# Patient Record
Sex: MALE | Race: WHITE | NOT HISPANIC OR LATINO | Employment: STUDENT | URBAN - METROPOLITAN AREA
[De-identification: names, ages, dates, MRNs, and addresses within clinical notes are randomized per-mention and may not be internally consistent; named-entity substitution may affect disease eponyms.]

---
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
## 2019-01-21 PROBLEM — T81.89XA SURGICAL WOUND, NON HEALING: Status: ACTIVE | Noted: 2019-01-21

## 2020-03-02 ENCOUNTER — TELEPHONE (OUTPATIENT)
Dept: FAMILY MEDICINE | Facility: CLINIC | Age: 20
End: 2020-03-02

## 2020-05-12 ENCOUNTER — OFFICE VISIT (OUTPATIENT)
Dept: FAMILY MEDICINE | Facility: CLINIC | Age: 20
End: 2020-05-12
Payer: MEDICAID

## 2020-05-12 VITALS
BODY MASS INDEX: 46.65 KG/M2 | SYSTOLIC BLOOD PRESSURE: 128 MMHG | HEART RATE: 112 BPM | TEMPERATURE: 98 F | HEIGHT: 69 IN | DIASTOLIC BLOOD PRESSURE: 86 MMHG | WEIGHT: 315 LBS | OXYGEN SATURATION: 97 %

## 2020-05-12 DIAGNOSIS — F90.2 ATTENTION DEFICIT HYPERACTIVITY DISORDER (ADHD), COMBINED TYPE: Primary | ICD-10-CM

## 2020-05-12 DIAGNOSIS — Z13.220 ENCOUNTER FOR LIPID SCREENING FOR CARDIOVASCULAR DISEASE: ICD-10-CM

## 2020-05-12 DIAGNOSIS — E29.1 HYPOGONADISM IN MALE: ICD-10-CM

## 2020-05-12 DIAGNOSIS — Z13.6 ENCOUNTER FOR LIPID SCREENING FOR CARDIOVASCULAR DISEASE: ICD-10-CM

## 2020-05-12 DIAGNOSIS — R62.50 DEVELOPMENTAL DELAY: ICD-10-CM

## 2020-05-12 DIAGNOSIS — Z00.00 ROUTINE HEALTH MAINTENANCE: ICD-10-CM

## 2020-05-12 DIAGNOSIS — Q75.3 MACROCEPHALY: ICD-10-CM

## 2020-05-12 PROCEDURE — 90471 IMMUNIZATION ADMIN: CPT | Mod: S$GLB,,, | Performed by: FAMILY MEDICINE

## 2020-05-12 PROCEDURE — 90651 HPV VACCINE 9-VALENT 3 DOSE IM: ICD-10-PCS | Mod: S$GLB,,, | Performed by: FAMILY MEDICINE

## 2020-05-12 PROCEDURE — 99213 PR OFFICE/OUTPT VISIT, EST, LEVL III, 20-29 MIN: ICD-10-PCS | Mod: 25,S$GLB,, | Performed by: FAMILY MEDICINE

## 2020-05-12 PROCEDURE — 99213 OFFICE O/P EST LOW 20 MIN: CPT | Mod: 25,S$GLB,, | Performed by: FAMILY MEDICINE

## 2020-05-12 PROCEDURE — 90651 9VHPV VACCINE 2/3 DOSE IM: CPT | Mod: S$GLB,,, | Performed by: FAMILY MEDICINE

## 2020-05-12 PROCEDURE — 90471 HPV VACCINE 9-VALENT 3 DOSE IM: ICD-10-PCS | Mod: S$GLB,,, | Performed by: FAMILY MEDICINE

## 2020-05-12 RX ORDER — METHYLPHENIDATE HYDROCHLORIDE 54 MG/1
54 TABLET ORAL EVERY MORNING
Qty: 30 TABLET | Refills: 0 | Status: SHIPPED | OUTPATIENT
Start: 2020-05-12 | End: 2020-06-10 | Stop reason: SDUPTHER

## 2020-05-12 RX ORDER — CETIRIZINE HYDROCHLORIDE 10 MG/1
10 TABLET ORAL DAILY
Qty: 90 TABLET | Refills: 3 | Status: SHIPPED | OUTPATIENT
Start: 2020-05-12 | End: 2021-12-21

## 2020-05-12 NOTE — PROGRESS NOTES
" Patient ID: James Loya is a 19 y.o. male.    Chief Complaint: Establish Care and Medication Refill    HPI      James Loya is a 19 y.o. male here for establishment of care.  Patient is a 19-year-old gentleman with developmental delay.  He has completed high school however was very difficult and almost was not able to achieve goal.  Currently does not Dr.-would like to go to  school but cannot live on his own in an area to go to school.  Under his parents care.  Without medication-ADHD-Concerta-very difficult for him to focus-last a lot and is unable to accomplish tasks.  Takes on a daily basis.    History of asthma however no recent problems.    Obesity-has lost nearly 100 exercising-riding his bike on the keto diet.    The overall no complaints at this time    Vitals:    05/12/20 0917   BP: 128/86   Pulse: (!) 112   Temp: 98.4 °F (36.9 °C)   SpO2: 97%   Weight: (!) 147.1 kg (324 lb 6.5 oz)   Height: 5' 9" (1.753 m)            Review of Symptoms    Constitutional  Neg activity change, No chills /fever   Resp  Neg hemoptysis, stridor, choking  CVS  Neg chest pain, palpitations  Other review systems up to 14 the normal    Physical Exam    Constitutional:  Oriented to person, place, and time.appears well-developed and well-nourished.  No distress.      HENT  Head: Normocephalic and atraumatic  Right Ear: External ear normal.   Left Ear: External ear normal.   Nose: External nose normal.   Mouth:  Moist mucus membranes.    Eyes:  Conjunctivae are normal. Right eye exhibits no discharge.  Left eye exhibits no discharge. No scleral icterus.  No periorbital edema    Cardiovascular:  Regular rate and rhythm with normal S1 and S2     Pulmonary/Chest:   Clear to auscultation bilaterally without wheezes, rhonchi or rales      Musculoskeletal:  No edema. No obvious deformity No wasting       Neurological:  Alert and oriented to person, place, and time.   Coordination normal.     Skin:   Skin is " warm and dry.  No diaphoresis.   No rash noted.     Psychiatric: Normal mood and affect. Behavior is normal.  Judgment and thought content normal.     Complete Blood Count  Lab Results   Component Value Date    RBC 4.29 (L) 12/20/2018    HGB 14.0 12/20/2018    HCT 41.2 12/20/2018    MCV 96 12/20/2018    MCH 32.6 (H) 12/20/2018    MCHC 34.0 12/20/2018    RDW 12.5 12/20/2018     12/20/2018    MPV 10.3 12/20/2018    GRAN 6.3 12/20/2018    GRAN 71.6 12/20/2018    LYMPH 1.5 12/20/2018    LYMPH 16.4 (L) 12/20/2018    MONO 0.9 12/20/2018    MONO 10.0 12/20/2018    EOS 0.2 12/20/2018    BASO 0.01 12/20/2018    EOSINOPHIL 1.7 12/20/2018    BASOPHIL 0.1 12/20/2018    DIFFMETHOD Automated 12/20/2018       Comprehensive Metabolic Panel  No results found for: GLU, BUN, CREATININE, NA, K, CL, PROT, ALBUMIN, BILITOT, AST, ALKPHOS, CO2, ALT, ANIONGAP, EGFRNONAA, ESTGFRAFRICA    TSH  No results found for: TSH    Assessment / Plan:      ICD-10-CM ICD-9-CM   1. Attention deficit hyperactivity disorder (ADHD), combined type F90.2 314.01   2. Routine health maintenance Z00.00 V70.0   3. Encounter for lipid screening for cardiovascular disease Z13.220 V77.91    Z13.6 V81.2   4. Macrocephaly Q75.3 756.0   5. Developmental delay R62.50 783.40   6. Hypogonadism in male E29.1 257.2     Attention deficit hyperactivity disorder (ADHD), combined type    Routine health maintenance  -     Comprehensive metabolic panel; Future; Expected date: 05/12/2020  -     CBC auto differential; Future; Expected date: 05/12/2020  -     TSH; Future; Expected date: 05/12/2020  -     Lipid Panel; Future; Expected date: 05/12/2020  -     Testosterone; Future  -     T4, free; Future; Expected date: 05/12/2020    Encounter for lipid screening for cardiovascular disease  -     Comprehensive metabolic panel; Future; Expected date: 05/12/2020  -     CBC auto differential; Future; Expected date: 05/12/2020  -     TSH; Future; Expected date: 05/12/2020  -      Lipid Panel; Future; Expected date: 05/12/2020    Macrocephaly  -     Comprehensive metabolic panel; Future; Expected date: 05/12/2020  -     CBC auto differential; Future; Expected date: 05/12/2020  -     TSH; Future; Expected date: 05/12/2020  -     Lipid Panel; Future; Expected date: 05/12/2020  -     Testosterone; Future  -     T4, free; Future; Expected date: 05/12/2020    Developmental delay  -     Comprehensive metabolic panel; Future; Expected date: 05/12/2020  -     CBC auto differential; Future; Expected date: 05/12/2020  -     TSH; Future; Expected date: 05/12/2020  -     Lipid Panel; Future; Expected date: 05/12/2020  -     Testosterone; Future  -     T4, free; Future; Expected date: 05/12/2020    Hypogonadism in male  -     Comprehensive metabolic panel; Future; Expected date: 05/12/2020  -     CBC auto differential; Future; Expected date: 05/12/2020  -     TSH; Future; Expected date: 05/12/2020  -     Lipid Panel; Future; Expected date: 05/12/2020  -     Testosterone; Future  -     T4, free; Future; Expected date: 05/12/2020    Other orders  -     methylphenidate HCl (CONCERTA) 54 MG CR tablet; Take 1 tablet (54 mg total) by mouth every morning.  Dispense: 30 tablet; Refill: 0  -     cetirizine (ZYRTEC) 10 MG tablet; Take 1 tablet (10 mg total) by mouth once daily.  Dispense: 90 tablet; Refill: 3  -     (In Office Administered) HPV Vaccine (9-Valent) (3 Dose) (IM)     Discussed routine wellness-physical sexual-Mental Health.  Continue weight loss    History hypogonadism is a male-no other known endocrine deficiencies per his mother.

## 2020-05-28 ENCOUNTER — LAB VISIT (OUTPATIENT)
Dept: LAB | Facility: HOSPITAL | Age: 20
End: 2020-05-28
Attending: FAMILY MEDICINE
Payer: MEDICAID

## 2020-05-28 DIAGNOSIS — E29.1 HYPOGONADISM IN MALE: ICD-10-CM

## 2020-05-28 DIAGNOSIS — Z13.6 ENCOUNTER FOR LIPID SCREENING FOR CARDIOVASCULAR DISEASE: ICD-10-CM

## 2020-05-28 DIAGNOSIS — R62.50 DEVELOPMENTAL DELAY: ICD-10-CM

## 2020-05-28 DIAGNOSIS — Z13.220 ENCOUNTER FOR LIPID SCREENING FOR CARDIOVASCULAR DISEASE: ICD-10-CM

## 2020-05-28 DIAGNOSIS — Z00.00 ROUTINE HEALTH MAINTENANCE: ICD-10-CM

## 2020-05-28 DIAGNOSIS — Q75.3 MACROCEPHALY: ICD-10-CM

## 2020-05-28 LAB
ALBUMIN SERPL BCP-MCNC: 4.2 G/DL (ref 3.5–5.2)
ALP SERPL-CCNC: 62 U/L (ref 38–126)
ALT SERPL W/O P-5'-P-CCNC: 22 U/L (ref 10–44)
ANION GAP SERPL CALC-SCNC: 6 MMOL/L (ref 8–16)
AST SERPL-CCNC: 26 U/L (ref 15–46)
BASOPHILS # BLD AUTO: 0.04 K/UL (ref 0–0.2)
BASOPHILS NFR BLD: 0.5 % (ref 0–1.9)
BILIRUB SERPL-MCNC: 0.6 MG/DL (ref 0.1–1)
BUN SERPL-MCNC: 18 MG/DL (ref 2–20)
CALCIUM SERPL-MCNC: 9.4 MG/DL (ref 8.7–10.5)
CHLORIDE SERPL-SCNC: 105 MMOL/L (ref 95–110)
CHOLEST SERPL-MCNC: 149 MG/DL (ref 120–199)
CHOLEST/HDLC SERPL: 3.8 {RATIO} (ref 2–5)
CO2 SERPL-SCNC: 28 MMOL/L (ref 23–29)
CREAT SERPL-MCNC: 0.75 MG/DL (ref 0.5–1.4)
DIFFERENTIAL METHOD: ABNORMAL
EOSINOPHIL # BLD AUTO: 0.2 K/UL (ref 0–0.5)
EOSINOPHIL NFR BLD: 2.1 % (ref 0–8)
ERYTHROCYTE [DISTWIDTH] IN BLOOD BY AUTOMATED COUNT: 11.7 % (ref 11.5–14.5)
EST. GFR  (AFRICAN AMERICAN): >60 ML/MIN/1.73 M^2
EST. GFR  (NON AFRICAN AMERICAN): >60 ML/MIN/1.73 M^2
GLUCOSE SERPL-MCNC: 86 MG/DL (ref 70–110)
HCT VFR BLD AUTO: 44.5 % (ref 40–54)
HDLC SERPL-MCNC: 39 MG/DL (ref 40–75)
HDLC SERPL: 26.2 % (ref 20–50)
HGB BLD-MCNC: 15.2 G/DL (ref 14–18)
IMM GRANULOCYTES # BLD AUTO: 0.02 K/UL (ref 0–0.04)
IMM GRANULOCYTES NFR BLD AUTO: 0.3 % (ref 0–0.5)
LDLC SERPL CALC-MCNC: 97.4 MG/DL (ref 63–159)
LYMPHOCYTES # BLD AUTO: 2.6 K/UL (ref 1–4.8)
LYMPHOCYTES NFR BLD: 34.6 % (ref 18–48)
MCH RBC QN AUTO: 32.8 PG (ref 27–31)
MCHC RBC AUTO-ENTMCNC: 34.2 G/DL (ref 32–36)
MCV RBC AUTO: 96 FL (ref 82–98)
MONOCYTES # BLD AUTO: 0.6 K/UL (ref 0.3–1)
MONOCYTES NFR BLD: 8.4 % (ref 4–15)
NEUTROPHILS # BLD AUTO: 4.1 K/UL (ref 1.8–7.7)
NEUTROPHILS NFR BLD: 54.1 % (ref 38–73)
NONHDLC SERPL-MCNC: 110 MG/DL
NRBC BLD-RTO: 0 /100 WBC
PLATELET # BLD AUTO: 243 K/UL (ref 150–350)
PMV BLD AUTO: 11.2 FL (ref 9.2–12.9)
POTASSIUM SERPL-SCNC: 4.5 MMOL/L (ref 3.5–5.1)
PROT SERPL-MCNC: 7.1 G/DL (ref 6–8.4)
RBC # BLD AUTO: 4.63 M/UL (ref 4.6–6.2)
SODIUM SERPL-SCNC: 139 MMOL/L (ref 136–145)
T4 FREE SERPL-MCNC: 1.13 NG/DL (ref 0.71–1.51)
TESTOST SERPL-MCNC: 490 NG/DL (ref 304–1227)
TRIGL SERPL-MCNC: 63 MG/DL (ref 30–150)
TSH SERPL DL<=0.005 MIU/L-ACNC: 3.01 UIU/ML (ref 0.4–4)
WBC # BLD AUTO: 7.58 K/UL (ref 3.9–12.7)

## 2020-05-28 PROCEDURE — 36415 COLL VENOUS BLD VENIPUNCTURE: CPT | Mod: PO

## 2020-05-28 PROCEDURE — 85025 COMPLETE CBC W/AUTO DIFF WBC: CPT | Mod: PO

## 2020-05-28 PROCEDURE — 84403 ASSAY OF TOTAL TESTOSTERONE: CPT | Mod: PO

## 2020-05-28 PROCEDURE — 80053 COMPREHEN METABOLIC PANEL: CPT | Mod: PO

## 2020-05-28 PROCEDURE — 84443 ASSAY THYROID STIM HORMONE: CPT | Mod: PO

## 2020-05-28 PROCEDURE — 84439 ASSAY OF FREE THYROXINE: CPT

## 2020-05-28 PROCEDURE — 80061 LIPID PANEL: CPT

## 2020-06-10 RX ORDER — METHYLPHENIDATE HYDROCHLORIDE 54 MG/1
54 TABLET ORAL EVERY MORNING
Qty: 30 TABLET | Refills: 0 | Status: SHIPPED | OUTPATIENT
Start: 2020-06-10 | End: 2020-07-12 | Stop reason: SDUPTHER

## 2020-08-10 RX ORDER — METHYLPHENIDATE HYDROCHLORIDE 54 MG/1
54 TABLET ORAL EVERY MORNING
Qty: 30 TABLET | Refills: 0 | Status: SHIPPED | OUTPATIENT
Start: 2020-08-10 | End: 2020-09-11 | Stop reason: SDUPTHER

## 2020-09-11 RX ORDER — METHYLPHENIDATE HYDROCHLORIDE 54 MG/1
54 TABLET ORAL EVERY MORNING
Qty: 30 TABLET | Refills: 0 | Status: SHIPPED | OUTPATIENT
Start: 2020-09-11 | End: 2020-10-11 | Stop reason: SDUPTHER

## 2020-09-21 ENCOUNTER — TELEPHONE (OUTPATIENT)
Dept: FAMILY MEDICINE | Facility: CLINIC | Age: 20
End: 2020-09-21

## 2020-09-21 NOTE — TELEPHONE ENCOUNTER
----- Message from Geneva Jacob sent at 9/21/2020 10:57 AM CDT -----  Contact: María Gregg (mother)  Type:  Sooner Apoointment Request    Caller is requesting a sooner appointment.  Caller declined first available appointment listed below.  Caller will not accept being placed on the waitlist and is requesting a message be sent to doctor.  Name of Caller: María  When is the first available appointment? 11/24  Symptoms: 6 month f/u ADD meds  Would the patient rather a call back or a response via [facility]?  Call back   Best Call Back Number: [phone]  Additional Information:  due for 10/14

## 2020-09-23 ENCOUNTER — CLINICAL SUPPORT (OUTPATIENT)
Dept: FAMILY MEDICINE | Facility: CLINIC | Age: 20
End: 2020-09-23
Payer: MEDICAID

## 2020-09-23 DIAGNOSIS — Z23 NEED FOR TETANUS BOOSTER: ICD-10-CM

## 2020-09-23 DIAGNOSIS — Z23 NEED FOR INFLUENZA VACCINATION: Primary | ICD-10-CM

## 2020-09-23 DIAGNOSIS — Z23 NEED FOR PNEUMOCOCCAL VACCINATION: ICD-10-CM

## 2020-09-23 PROCEDURE — 90686 FLU VACCINE (QUAD) GREATER THAN OR EQUAL TO 3YO PRESERVATIVE FREE IM: ICD-10-PCS | Mod: S$GLB,,, | Performed by: FAMILY MEDICINE

## 2020-09-23 PROCEDURE — 90471 FLU VACCINE (QUAD) GREATER THAN OR EQUAL TO 3YO PRESERVATIVE FREE IM: ICD-10-PCS | Mod: S$GLB,,, | Performed by: FAMILY MEDICINE

## 2020-09-23 PROCEDURE — 90715 TDAP VACCINE GREATER THAN OR EQUAL TO 7YO IM: ICD-10-PCS | Mod: S$GLB,,, | Performed by: FAMILY MEDICINE

## 2020-09-23 PROCEDURE — 90471 IMMUNIZATION ADMIN: CPT | Mod: S$GLB,,, | Performed by: FAMILY MEDICINE

## 2020-09-23 PROCEDURE — 90472 IMMUNIZATION ADMIN EACH ADD: CPT | Mod: S$GLB,,, | Performed by: FAMILY MEDICINE

## 2020-09-23 PROCEDURE — 90715 TDAP VACCINE 7 YRS/> IM: CPT | Mod: S$GLB,,, | Performed by: FAMILY MEDICINE

## 2020-09-23 PROCEDURE — 90472 PR IMMUNIZ,ADMIN,EACH ADDL: ICD-10-PCS | Mod: S$GLB,,, | Performed by: FAMILY MEDICINE

## 2020-09-23 PROCEDURE — 90732 PNEUMOCOCCAL POLYSACCHARIDE VACCINE 23-VALENT =>2YO SQ IM: ICD-10-PCS | Mod: S$GLB,,, | Performed by: FAMILY MEDICINE

## 2020-09-23 PROCEDURE — 90732 PPSV23 VACC 2 YRS+ SUBQ/IM: CPT | Mod: S$GLB,,, | Performed by: FAMILY MEDICINE

## 2020-09-23 PROCEDURE — 90686 IIV4 VACC NO PRSV 0.5 ML IM: CPT | Mod: S$GLB,,, | Performed by: FAMILY MEDICINE

## 2020-10-12 RX ORDER — METHYLPHENIDATE HYDROCHLORIDE 54 MG/1
54 TABLET ORAL EVERY MORNING
Qty: 30 TABLET | Refills: 0 | Status: SHIPPED | OUTPATIENT
Start: 2020-10-12 | End: 2020-11-02 | Stop reason: SDUPTHER

## 2020-10-15 ENCOUNTER — TELEPHONE (OUTPATIENT)
Dept: ADMINISTRATIVE | Facility: HOSPITAL | Age: 20
End: 2020-10-15

## 2020-10-15 ENCOUNTER — CLINICAL SUPPORT (OUTPATIENT)
Dept: FAMILY MEDICINE | Facility: CLINIC | Age: 20
End: 2020-10-15
Payer: MEDICAID

## 2020-10-15 NOTE — TELEPHONE ENCOUNTER
Contacted pt to discuss that during a routine audit we discovered a variation in the temperature of our medication refrigerator which may have affected the potency and effectiveness of our vaccines. The vaccine was not harmful but may have been ineffective so we recommend revaccination. No answer, left detailed voice message with pt mom.

## 2020-11-02 ENCOUNTER — OFFICE VISIT (OUTPATIENT)
Dept: FAMILY MEDICINE | Facility: CLINIC | Age: 20
End: 2020-11-02
Payer: MEDICAID

## 2020-11-02 VITALS
HEART RATE: 93 BPM | SYSTOLIC BLOOD PRESSURE: 130 MMHG | BODY MASS INDEX: 46.65 KG/M2 | WEIGHT: 315 LBS | HEIGHT: 69 IN | TEMPERATURE: 97 F | OXYGEN SATURATION: 96 % | DIASTOLIC BLOOD PRESSURE: 60 MMHG

## 2020-11-02 DIAGNOSIS — R62.50 DEVELOPMENTAL DELAY: ICD-10-CM

## 2020-11-02 DIAGNOSIS — F98.8 ATTENTION DEFICIT DISORDER, UNSPECIFIED HYPERACTIVITY PRESENCE: Primary | ICD-10-CM

## 2020-11-02 PROCEDURE — 99213 PR OFFICE/OUTPT VISIT, EST, LEVL III, 20-29 MIN: ICD-10-PCS | Mod: S$GLB,,, | Performed by: FAMILY MEDICINE

## 2020-11-02 PROCEDURE — 99213 OFFICE O/P EST LOW 20 MIN: CPT | Mod: S$GLB,,, | Performed by: FAMILY MEDICINE

## 2020-11-02 RX ORDER — METHYLPHENIDATE HYDROCHLORIDE 54 MG/1
54 TABLET ORAL EVERY MORNING
Qty: 30 TABLET | Refills: 0 | Status: SHIPPED | OUTPATIENT
Start: 2021-01-09 | End: 2021-12-21 | Stop reason: SDUPTHER

## 2020-11-02 RX ORDER — METHYLPHENIDATE HYDROCHLORIDE 54 MG/1
54 TABLET ORAL EVERY MORNING
Qty: 30 TABLET | Refills: 0 | Status: SHIPPED | OUTPATIENT
Start: 2020-11-11 | End: 2021-01-10 | Stop reason: SDUPTHER

## 2020-11-02 RX ORDER — METHYLPHENIDATE HYDROCHLORIDE 54 MG/1
54 TABLET ORAL EVERY MORNING
Qty: 30 TABLET | Refills: 0 | Status: SHIPPED | OUTPATIENT
Start: 2020-12-11 | End: 2021-12-21 | Stop reason: SDUPTHER

## 2020-11-02 NOTE — PROGRESS NOTES
"   Patient ID: James Loya is a 20 y.o. male.    Chief Complaint: Follow-up (6 month) and add    HPI      Patient here for refill of the medicines used to treat attention deficit disorder. Doing well on the medicines and does not want to change. No significant weight changes, tachycardia or agitation.  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  Discussed that this is a good job for him to walk and do exercise.        Review of Symptoms    Constitutional  Neg activity change, chills fever   Resp  Neg hemoptysis, stridor, choking  CVS  Neg chest pain, palpitations    Physical Exam  Vitals:    11/02/20 0824   BP: 130/60   BP Location: Right arm   Patient Position: Sitting   Pulse: 93   Temp: 97.2 °F (36.2 °C)   TempSrc: Oral   SpO2: 96%   Weight: (!) 155.4 kg (342 lb 11.3 oz)   Height: 5' 9" (1.753 m)        Constitutional:   Oriented to person, place, and time.appears well-developed and well-nourished.   No distress.     HENT  Head: Normocephalic and atraumatic  Right Ear: External ear normal.   Left Ear: External ear normal.   Nose: External nose normal.   Mouth: Moist Mucus Membranes  Eyes:   Conjunctivae are normal. Right eye exhibits no discharge. Left eye exhibits no discharge. No scleral icterus. No periorbital edema    Cardiovascular:  Regular rate and rhythm with normal S1 and S2     Pulmonary/Chest:   Clear to auscultation bilaterally without wheezes, rhonchi or rales    Musculoskeletal:  No edema. No obvious deformity No wasting   Neurological:   alert and oriented to person, place, and time. Coordination normal.     Skin:   Skin is warm and dry. No rash noted.  No diaphoresis.     Psychiatric: Normal mood and affect. Behavior is normal. Judgment and thought   content normal.       Assessment / Plan:        ICD-10-CM ICD-9-CM   1. Attention deficit disorder, unspecified hyperactivity presence  F98.8 314.00   2. Developmental delay  R62.50 783.40     Attention deficit disorder, unspecified hyperactivity " presence    Developmental delay    Other orders  -     methylphenidate HCl 54 MG CR tablet; Take 1 tablet (54 mg total) by mouth every morning.  Dispense: 30 tablet; Refill: 0  -     methylphenidate HCl 54 MG CR tablet; Take 1 tablet (54 mg total) by mouth every morning.  Dispense: 30 tablet; Refill: 0  -     methylphenidate HCl 54 MG CR tablet; Take 1 tablet (54 mg total) by mouth every morning.  Dispense: 30 tablet; Refill: 0          Arnie Foster MD

## 2020-11-11 ENCOUNTER — PATIENT MESSAGE (OUTPATIENT)
Dept: FAMILY MEDICINE | Facility: CLINIC | Age: 20
End: 2020-11-11

## 2021-01-11 RX ORDER — METHYLPHENIDATE HYDROCHLORIDE 54 MG/1
54 TABLET ORAL EVERY MORNING
Qty: 30 TABLET | Refills: 0 | Status: SHIPPED | OUTPATIENT
Start: 2021-01-11 | End: 2021-02-09 | Stop reason: SDUPTHER

## 2021-02-09 RX ORDER — METHYLPHENIDATE HYDROCHLORIDE 54 MG/1
54 TABLET ORAL EVERY MORNING
Qty: 30 TABLET | Refills: 0 | Status: SHIPPED | OUTPATIENT
Start: 2021-02-09 | End: 2021-04-11 | Stop reason: SDUPTHER

## 2021-04-12 RX ORDER — METHYLPHENIDATE HYDROCHLORIDE 54 MG/1
54 TABLET ORAL EVERY MORNING
Qty: 30 TABLET | Refills: 0 | Status: SHIPPED | OUTPATIENT
Start: 2021-04-12 | End: 2021-05-11 | Stop reason: SDUPTHER

## 2021-05-11 RX ORDER — METHYLPHENIDATE HYDROCHLORIDE 54 MG/1
54 TABLET ORAL EVERY MORNING
Qty: 30 TABLET | Refills: 0 | Status: SHIPPED | OUTPATIENT
Start: 2021-05-11 | End: 2021-06-15 | Stop reason: SDUPTHER

## 2021-05-20 ENCOUNTER — OFFICE VISIT (OUTPATIENT)
Dept: FAMILY MEDICINE | Facility: CLINIC | Age: 21
End: 2021-05-20
Payer: MEDICAID

## 2021-05-20 VITALS
OXYGEN SATURATION: 96 % | WEIGHT: 315 LBS | BODY MASS INDEX: 46.65 KG/M2 | HEART RATE: 64 BPM | TEMPERATURE: 97 F | SYSTOLIC BLOOD PRESSURE: 110 MMHG | HEIGHT: 69 IN | DIASTOLIC BLOOD PRESSURE: 60 MMHG

## 2021-05-20 DIAGNOSIS — F98.8 ATTENTION DEFICIT DISORDER, UNSPECIFIED HYPERACTIVITY PRESENCE: Primary | ICD-10-CM

## 2021-05-20 PROCEDURE — 99213 PR OFFICE/OUTPT VISIT, EST, LEVL III, 20-29 MIN: ICD-10-PCS | Mod: S$GLB,,, | Performed by: FAMILY MEDICINE

## 2021-05-20 PROCEDURE — 99213 OFFICE O/P EST LOW 20 MIN: CPT | Mod: S$GLB,,, | Performed by: FAMILY MEDICINE

## 2021-05-20 RX ORDER — MINERAL OIL
180 ENEMA (ML) RECTAL DAILY
Qty: 90 TABLET | Refills: 0 | Status: SHIPPED | OUTPATIENT
Start: 2021-05-20 | End: 2024-01-10

## 2021-05-20 RX ORDER — FLUTICASONE PROPIONATE 50 MCG
1 SPRAY, SUSPENSION (ML) NASAL DAILY
Qty: 16 G | Refills: 1 | Status: SHIPPED | OUTPATIENT
Start: 2021-05-20 | End: 2024-01-10

## 2021-06-15 RX ORDER — METHYLPHENIDATE HYDROCHLORIDE 54 MG/1
54 TABLET ORAL EVERY MORNING
Qty: 30 TABLET | Refills: 0 | Status: SHIPPED | OUTPATIENT
Start: 2021-06-15 | End: 2021-07-15 | Stop reason: SDUPTHER

## 2021-07-15 RX ORDER — METHYLPHENIDATE HYDROCHLORIDE 54 MG/1
54 TABLET ORAL EVERY MORNING
Qty: 30 TABLET | Refills: 0 | Status: SHIPPED | OUTPATIENT
Start: 2021-07-15 | End: 2021-08-09 | Stop reason: SDUPTHER

## 2021-08-11 RX ORDER — METHYLPHENIDATE HYDROCHLORIDE 54 MG/1
54 TABLET ORAL EVERY MORNING
Qty: 30 TABLET | Refills: 0 | Status: SHIPPED | OUTPATIENT
Start: 2021-08-11 | End: 2021-09-12 | Stop reason: SDUPTHER

## 2021-09-12 RX ORDER — METHYLPHENIDATE HYDROCHLORIDE 54 MG/1
54 TABLET ORAL EVERY MORNING
Qty: 30 TABLET | Refills: 0 | Status: SHIPPED | OUTPATIENT
Start: 2021-09-12 | End: 2021-10-14 | Stop reason: SDUPTHER

## 2021-10-14 RX ORDER — METHYLPHENIDATE HYDROCHLORIDE 54 MG/1
54 TABLET ORAL EVERY MORNING
Qty: 30 TABLET | Refills: 0 | Status: SHIPPED | OUTPATIENT
Start: 2021-10-14 | End: 2021-11-15 | Stop reason: SDUPTHER

## 2021-11-15 RX ORDER — METHYLPHENIDATE HYDROCHLORIDE 54 MG/1
54 TABLET ORAL EVERY MORNING
Qty: 30 TABLET | Refills: 0 | Status: SHIPPED | OUTPATIENT
Start: 2021-11-15 | End: 2021-12-15 | Stop reason: SDUPTHER

## 2021-11-30 ENCOUNTER — TELEPHONE (OUTPATIENT)
Dept: FAMILY MEDICINE | Facility: CLINIC | Age: 21
End: 2021-11-30
Payer: MEDICAID

## 2021-12-15 RX ORDER — METHYLPHENIDATE HYDROCHLORIDE 54 MG/1
54 TABLET ORAL EVERY MORNING
Qty: 30 TABLET | Refills: 0 | Status: SHIPPED | OUTPATIENT
Start: 2021-12-15 | End: 2021-12-21 | Stop reason: SDUPTHER

## 2021-12-21 ENCOUNTER — OFFICE VISIT (OUTPATIENT)
Dept: FAMILY MEDICINE | Facility: CLINIC | Age: 21
End: 2021-12-21
Payer: MEDICAID

## 2021-12-21 VITALS
SYSTOLIC BLOOD PRESSURE: 130 MMHG | OXYGEN SATURATION: 97 % | BODY MASS INDEX: 46.65 KG/M2 | DIASTOLIC BLOOD PRESSURE: 60 MMHG | HEIGHT: 69 IN | WEIGHT: 315 LBS | TEMPERATURE: 98 F | HEART RATE: 98 BPM

## 2021-12-21 DIAGNOSIS — F98.8 ATTENTION DEFICIT DISORDER, UNSPECIFIED HYPERACTIVITY PRESENCE: Primary | ICD-10-CM

## 2021-12-21 DIAGNOSIS — R62.50 DEVELOPMENTAL DELAY: ICD-10-CM

## 2021-12-21 PROCEDURE — 3008F BODY MASS INDEX DOCD: CPT | Mod: CPTII,S$GLB,, | Performed by: FAMILY MEDICINE

## 2021-12-21 PROCEDURE — 3008F PR BODY MASS INDEX (BMI) DOCUMENTED: ICD-10-PCS | Mod: CPTII,S$GLB,, | Performed by: FAMILY MEDICINE

## 2021-12-21 PROCEDURE — 99214 PR OFFICE/OUTPT VISIT, EST, LEVL IV, 30-39 MIN: ICD-10-PCS | Mod: S$GLB,,, | Performed by: FAMILY MEDICINE

## 2021-12-21 PROCEDURE — 1159F PR MEDICATION LIST DOCUMENTED IN MEDICAL RECORD: ICD-10-PCS | Mod: CPTII,S$GLB,, | Performed by: FAMILY MEDICINE

## 2021-12-21 PROCEDURE — 99214 OFFICE O/P EST MOD 30 MIN: CPT | Mod: S$GLB,,, | Performed by: FAMILY MEDICINE

## 2021-12-21 PROCEDURE — 1159F MED LIST DOCD IN RCRD: CPT | Mod: CPTII,S$GLB,, | Performed by: FAMILY MEDICINE

## 2021-12-21 RX ORDER — METHYLPHENIDATE HYDROCHLORIDE 54 MG/1
54 TABLET ORAL EVERY MORNING
Qty: 30 TABLET | Refills: 0 | Status: SHIPPED | OUTPATIENT
Start: 2022-01-14 | End: 2021-12-21 | Stop reason: SDUPTHER

## 2021-12-21 RX ORDER — METHYLPHENIDATE HYDROCHLORIDE 54 MG/1
54 TABLET ORAL EVERY MORNING
Qty: 30 TABLET | Refills: 0 | Status: SHIPPED | OUTPATIENT
Start: 2022-03-15 | End: 2022-04-14

## 2021-12-21 RX ORDER — METHYLPHENIDATE HYDROCHLORIDE 54 MG/1
54 TABLET ORAL EVERY MORNING
Qty: 30 TABLET | Refills: 0 | Status: SHIPPED | OUTPATIENT
Start: 2022-02-13 | End: 2022-06-21

## 2022-01-12 ENCOUNTER — PATIENT MESSAGE (OUTPATIENT)
Dept: FAMILY MEDICINE | Facility: CLINIC | Age: 22
End: 2022-01-12
Payer: MEDICAID

## 2022-01-12 RX ORDER — METHYLPHENIDATE HYDROCHLORIDE 54 MG/1
54 TABLET ORAL EVERY MORNING
Qty: 30 TABLET | Refills: 0 | Status: SHIPPED | OUTPATIENT
Start: 2022-01-13 | End: 2022-06-21

## 2022-02-03 NOTE — ED PROVIDER NOTES
DAKOTAH Rodriguez M Gastro Nurse Msg Pool  Advise patient of + clostridium difficile.  Likely cause of her continued symptoms.  Needs vancomycin 125 mg QID x 10 days.  Rx pended.  Verify pharmacy she wants sent to.  C diff is highly contagious.  Discuss importance of hand hygiene following using bathroom.  Clean bathroom surfaces with bleach type solution.  CDC has information for patients on c diff.  She should update me after treatment on how she is doing.  Keep scheduled colonoscopy d/t diverticulitis history.      Encounter Date: 12/18/2018    SCRIBE #1 NOTE: I, Mehdi Payne, am scribing for, and in the presence of,  Dr. Jack. I have scribed the entire note.       History     Chief Complaint   Patient presents with    Abscess     Pt has abscess to left lower extremity. Pt has been on antibitoics for this since 11/25- first Bactrim then Clindamycin now.      This is a 18 y.o. male who  has a past medical history of ADHD, Asthma, well controlled, Developmental delay, Mild mental retardation, Obesity, and Snoring who presents with chief complaint of abscess to left lower extremity for the past 3 weeks. He initially hit his leg on a table, sustaining a wound to the lower left led. He has been on two separate courses of Bactrim and Clindamycin since 11/25 with no resolution of the abscess, and has had an I&D. The patient notes pain with weight-bearing and ambulation, and the area is red and warm to touch. Family also reports an increase in swelling and spreading of the redness since this morning. Mother reports the patient had a 101 F fever earlier today, but fever resolved with Tylenol. On arrival, patient is afebrile with a HR of 135.      The history is provided by the patient.     Review of patient's allergies indicates:  No Known Allergies  Past Medical History:   Diagnosis Date    ADHD     Asthma, well controlled     Developmental delay     Mild mental retardation     Obesity     Snoring      Past Surgical History:   Procedure Laterality Date    None      SEPTOPLASTY Bilateral 9/7/2017    Performed by [facility] at [facility] OR 1ST FLR    TONSILLECTOMY Bilateral 9/7/2017    Performed by [facility]abbir [facility] MD at [facility] OR 1ST FLR    TYMPANOSTOMY TUBE PLACEMENT       Family History   Problem Relation Age of Onset    Atrial fibrillation Maternal Grandfather      Social History     Tobacco Use    Smoking status: Never Smoker    Smokeless tobacco: Never Used    Tobacco comment: No NAZIA   Substance Use Topics     Alcohol use: No    Drug use: No     Review of Systems   Constitutional: Negative for chills and fever.   HENT: Negative for facial swelling and trouble swallowing.    Eyes: Negative for redness.   Respiratory: Negative for shortness of breath.    Cardiovascular: Negative for chest pain.   Gastrointestinal: Negative for abdominal pain, diarrhea, nausea and vomiting.   Genitourinary: Negative for dysuria and hematuria.   Musculoskeletal: Negative for gait problem.   Skin: Positive for wound. Negative for rash.   Neurological: Negative for facial asymmetry and speech difficulty.     Physical Exam     Initial Vitals [12/18/18 1445]   BP Pulse Resp Temp SpO2   (!) 140/75 (!) 135 20 98.4 °F (36.9 °C) 98 %      MAP       --         Physical Exam    Nursing note and vitals reviewed.  Constitutional: He appears well-developed and well-nourished. He is not diaphoretic. No distress.   HENT:   Head: Normocephalic and atraumatic.   Mouth/Throat: Oropharynx is clear and moist.   Eyes: Conjunctivae and EOM are normal.   Neck: Normal range of motion. Neck supple.   Cardiovascular: Normal rate, regular rhythm and normal heart sounds.   Pulmonary/Chest: Breath sounds normal. No respiratory distress.   Abdominal: Soft. There is no tenderness.   Musculoskeletal: Normal range of motion. He exhibits no edema or tenderness.   Neurological: He is alert and oriented to person, place, and time. He has normal strength.   Skin: Skin is warm and dry. Abscess noted. There is erythema.   Diffuse erythema consistent with cellulitis with a 2 cm x 4 cm open area to the midpart of the tibial crest of the LLE; area is appropriately tender to touch, no active drainage       ED Course   Procedures  Labs Reviewed   CBC W/ AUTO DIFFERENTIAL - Abnormal; Notable for the following components:       Result Value    WBC 14.69 (*)     RBC 4.51 (*)     MCH 32.2 (*)     Gran # (ANC) 11.6 (*)     Mono # 1.2 (*)     Gran% 78.7 (*)     Lymph% 12.4 (*)     All other  components within normal limits   CULTURE, BLOOD   CULTURE, BLOOD   COMPREHENSIVE METABOLIC PANEL   LACTIC ACID, PLASMA   POCT GLUCOSE   POCT GLUCOSE MONITORING CONTINUOUS          X-Rays:   Independently Interpreted Readings:   Other Readings:  Reviewed by myself, read by radiology.    Imaging Results          X-Ray Tibia Fibula 2 View Left (Final result)  Result time 12/18/18 15:42:30    Final result by Amy Devi MD (12/18/18 15:42:30)                 Impression:      As above      Electronically signed by: Amy Devi MD  Date:    12/18/2018  Time:    15:42             Narrative:    EXAMINATION:  XR TIBIA FIBULA 2 VIEW LEFT    CLINICAL HISTORY:  Cellulitis, unspecified    TECHNIQUE:  AP and lateral views of the left tibia and fibula were performed.    COMPARISON:  None.    FINDINGS:  No fracture, no osseous lesions.  Soft tissue swelling seen circumferentially at the lower leg.  More focal soft tissue swelling pretibial region at the mid level of the tibia.  No abnormal periosteal reaction to suggest osteomyelitis or periostitis.                              Medical Decision Making:   Initial Assessment:   Pt is a 19 yo non-immunosuppressed WM who presents with worsening skin infection of LLE. Will ultrasound LE, get basic labs, blood cx, lactate, pain control.   Clinical Tests:   Lab Tests: Ordered and Reviewed  Radiological Study: Ordered and Reviewed  ED Management:  - CBC notable for WBC of 14K; H/H stable   - CMP WNL; no electrolyte abnormalities appreciated; renal function WNL   - Lactic acid WNL   - Blood cultures x2   - Plain radiograph of L tibia negative for acute fracture, dislocation, subcutaneous gas, periosteal elevation  - Attempted to drain small area of abscess, but pt refused to allow me to incise the abscess (a small cavity was appreciated on bedside ultrasound, along with diffuse cobblestoning pattern consistent with cellulitis)  - Pt administered IV clindamycin in ED  following collection of blood culture(s)  - Pt afebrile, but consistently tachycardic (105 -110) during his stay  - In light of pt failing two (2) discrete outpatient treatment regimens for the aforementioned cellulitis, as well as worsening of cellulitis per mother, I feel that the patient would benefit from admission for IV antibiotics and further wound management  - Mother and patient in agreement with plan for admission  - [facility] service to admit patient                        ED Course as of Dec 18 1746   Tue Dec 18, 2018   5238 Sort note: James Loya nontoxic/afebrile 18 y.o.  presented to the ED with c/o worsening cellulitis of the left lower extremity.  Patient presents with paperwork from PCP for concern of deep tissue abscess as he has failed outpatient clindamycin and Bactrim therapy.  Culture does show group B strep with susceptibility to clindamycin however area appears to be worsened.  The patient is nontoxic appearing however noted to be tachycardic      Patient seen and medically screened by Physician assistant in Sort process due to ED crowding.  Appropriate tests and/or medications ordered.  Care transferred to an alternate provider when patient was placed in an Exam Room from the [facility] for physical exam, additional orders, and disposition. 2:48 PM. LC    [LC]      ED Course User Index  [LC] JUMANA Patel     Clinical Impression:     1. Cellulitis      Disposition:   Disposition: Admitted  Condition: Fair      I, Christiano Jack,  personally performed the services described in this documentation. All medical record entries made by the scribe were at my direction and in my presence.  I have reviewed the chart and agree that the record reflects my personal performance and is accurate and complete. Christiano Jack M.D. 5:44 PM12/18/2018     Christiano Jack MD  12/18/18 1874

## 2022-02-27 ENCOUNTER — HOSPITAL ENCOUNTER (EMERGENCY)
Facility: HOSPITAL | Age: 22
Discharge: HOME | End: 2022-02-27
Attending: EMERGENCY MEDICINE
Payer: MEDICAID

## 2022-02-27 VITALS
RESPIRATION RATE: 18 BRPM | HEART RATE: 133 BPM | TEMPERATURE: 99 F | WEIGHT: 315 LBS | SYSTOLIC BLOOD PRESSURE: 144 MMHG | DIASTOLIC BLOOD PRESSURE: 88 MMHG | OXYGEN SATURATION: 96 % | BODY MASS INDEX: 59.07 KG/M2

## 2022-02-27 DIAGNOSIS — S21.212A: Primary | ICD-10-CM

## 2022-02-27 PROCEDURE — 12002 RPR S/N/AX/GEN/TRNK2.6-7.5CM: CPT

## 2022-02-27 PROCEDURE — 12002 RPR S/N/AX/GEN/TRNK2.6-7.5CM: CPT | Mod: ER

## 2022-02-27 PROCEDURE — 99283 EMERGENCY DEPT VISIT LOW MDM: CPT | Mod: ER

## 2022-02-27 PROCEDURE — 25000003 PHARM REV CODE 250: Mod: ER | Performed by: EMERGENCY MEDICINE

## 2022-02-27 RX ORDER — LIDOCAINE HYDROCHLORIDE 10 MG/ML
INJECTION, SOLUTION EPIDURAL; INFILTRATION; INTRACAUDAL; PERINEURAL
Status: DISCONTINUED
Start: 2022-02-27 | End: 2022-02-27 | Stop reason: HOSPADM

## 2022-02-27 RX ORDER — KETOROLAC TROMETHAMINE 10 MG/1
10 TABLET, FILM COATED ORAL
Status: COMPLETED | OUTPATIENT
Start: 2022-02-27 | End: 2022-02-27

## 2022-02-27 RX ORDER — KETOROLAC TROMETHAMINE 10 MG/1
10 TABLET, FILM COATED ORAL EVERY 8 HOURS PRN
Qty: 15 TABLET | Refills: 0 | Status: SHIPPED | OUTPATIENT
Start: 2022-02-27 | End: 2022-06-21

## 2022-02-27 RX ORDER — LIDOCAINE HYDROCHLORIDE 10 MG/ML
5 INJECTION, SOLUTION EPIDURAL; INFILTRATION; INTRACAUDAL; PERINEURAL
Status: COMPLETED | OUTPATIENT
Start: 2022-02-27 | End: 2022-02-27

## 2022-02-27 RX ADMIN — LIDOCAINE HYDROCHLORIDE 50 MG: 10 INJECTION, SOLUTION EPIDURAL; INFILTRATION; INTRACAUDAL; PERINEURAL at 08:02

## 2022-02-27 RX ADMIN — BACITRACIN, NEOMYCIN, POLYMYXIN B 1 EACH: 400; 3.5; 5 OINTMENT TOPICAL at 09:02

## 2022-02-27 RX ADMIN — KETOROLAC TROMETHAMINE 10 MG: 10 TABLET, FILM COATED ORAL at 09:02

## 2022-02-28 NOTE — ED PROVIDER NOTES
Encounter Date: 2/27/2022       History     Chief Complaint   Patient presents with    Laceration     Pt fell out of the bed a stationary truck. Pt has 2 large gashes on his left upper back. Bleeding is controlled. Pt denies SOB, LOC, head/neck pain     Patient currently presents with a chief complaint laceration.  This is localized to the upper left back.  This occurred just prior to arrival.  This occurred as a result of contact with spiked lug nuts when he feel from the bed of a stationary truck.  Patient notes no apparent foreign body.  Last Tdap in 10/2020.        Review of patient's allergies indicates:  No Known Allergies  Past Medical History:   Diagnosis Date    ADHD     Asthma, well controlled     Developmental delay     Mild mental retardation     Obesity     Snoring      Past Surgical History:   Procedure Laterality Date    ADENOIDECTOMY  2015    HERNIA REPAIR  2004    None      TONSILLECTOMY  2016    TYMPANOSTOMY TUBE PLACEMENT       Family History   Problem Relation Age of Onset    Alcohol abuse Father     Atrial fibrillation Maternal Grandfather     Depression Paternal Grandfather     Mental illness Paternal Grandfather     Drug abuse Paternal Uncle     Heart disease Maternal Grandmother      Social History     Tobacco Use    Smoking status: Never Smoker    Smokeless tobacco: Never Used    Tobacco comment: No NAZIA   Substance Use Topics    Alcohol use: No    Drug use: No     Review of Systems   Constitutional: Negative for chills and fever.   HENT: Negative for congestion and sore throat.    Respiratory: Negative for chest tightness and shortness of breath.    Cardiovascular: Negative for chest pain and palpitations.   Gastrointestinal: Negative for abdominal pain and vomiting.   Genitourinary: Negative for difficulty urinating and dysuria.   Skin: Negative for color change and rash.   Allergic/Immunologic: Negative for immunocompromised state.   Neurological: Negative for  weakness and numbness.   Hematological: Negative for adenopathy.   All other systems reviewed and are negative.    Physical Exam     Initial Vitals [02/27/22 1956]   BP Pulse Resp Temp SpO2   (!) 144/88 (!) 133 18 98.8 °F (37.1 °C) 96 %      MAP       --           Physical Exam    Constitutional: He appears well-developed and well-nourished. He is not diaphoretic. No distress.   HENT:   Head: Normocephalic and atraumatic.   Cardiovascular: Normal rate, regular rhythm, normal heart sounds and intact distal pulses.   Pulmonary/Chest: Breath sounds normal. No respiratory distress.   Musculoskeletal:      Thoracic back: Laceration present.        Back:       Comments: No foreign body or gross contamination noted.     Neurological: He is alert and oriented to person, place, and time.   Skin: Skin is warm and dry.   Psychiatric: He has a normal mood and affect. His behavior is normal.         ED Course   Lac Repair    Date/Time: 2/27/2022 9:47 PM  Performed by: Gabino Green MD  Authorized by: Gabino Green MD     Consent:     Consent obtained:  Verbal    Consent given by:  Patient    Risks discussed:  Poor wound healing, poor cosmetic result and infection  Universal protocol:     Patient identity confirmed:  Verbally with patient  Anesthesia:     Anesthesia method:  Local infiltration    Local anesthetic:  Lidocaine 1% w/o epi  Laceration details:     Location:  Trunk    Trunk location:  Upper back    Length (cm):  6  Pre-procedure details:     Preparation:  Patient was prepped and draped in usual sterile fashion  Exploration:     Wound extent: areolar tissue violated      Wound extent: no fascia violation noted, no foreign bodies/material noted and no muscle damage noted      Contaminated: no    Treatment:     Area cleansed with:  Betadine    Amount of cleaning:  Extensive    Debridement:  None  Skin repair:     Repair method:  Sutures    Suture size:  3-0    Suture material:  Prolene    Number of sutures:   7  Approximation:     Approximation:  Close  Repair type:     Repair type:  Simple  Post-procedure details:     Dressing:  Sterile dressing    Procedure completion:  Tolerated well, no immediate complications      Labs Reviewed - No data to display       Imaging Results    None          Medications   LIDOcaine (PF) 10 mg/ml (1%) 10 mg/mL (1 %) injection (has no administration in time range)   LIDOcaine (PF) 10 mg/ml (1%) injection 50 mg (50 mg Other Given 2/27/22 2013)   neomycin-bacitracnZn-polymyxnB packet 1 each (1 each Topical (Top) Given 2/27/22 2112)   ketorolac tablet 10 mg (10 mg Oral Given 2/27/22 2112)     Medical Decision Making:   ED Management:  Patient/family were briefed regarding the extent of the injury as well as the details of the repair.  They have been counseled regarding appropriate wound care and potential signs of infection that should prompt return to the emergency room for reevaluation. I see no indication of an emergent process beyond that addressed during our encounter but have duly counseled the patient/family regarding the need for prompt follow-up as well as the indications that should prompt immediate return to the emergency room should new or worrisome developments occur.  The patient/family communicates understanding of all this information and all remaining questions and concerns were addressed at this time.                        Clinical Impression:   Final diagnoses:  [S21.212A] Laceration of left back wall of thorax without foreign body without penetration into thoracic cavity, initial encounter (Primary)          ED Disposition Condition    Discharge Stable        ED Prescriptions     Medication Sig Dispense Start Date End Date Auth. Provider    ketorolac (TORADOL) 10 mg tablet Take 1 tablet (10 mg total) by mouth every 8 (eight) hours as needed for Pain. 15 tablet 2/27/2022  Gabino Green MD        Follow-up Information     Follow up With Specialties Details Why Contact  Info    Arnie Foster MD Family Medicine Schedule an appointment as soon as possible for a visit  for reassessment [address] [place] [address] [id number] [phone]      [facility] - Emergency Dept Emergency Medicine Go to  As needed, If symptoms worsen [address] [facility] [address] [phone]           Gabino Green MD  02/27/22 9253

## 2022-03-02 ENCOUNTER — TELEPHONE (OUTPATIENT)
Dept: FAMILY MEDICINE | Facility: CLINIC | Age: 22
End: 2022-03-02
Payer: MEDICAID

## 2022-03-02 NOTE — TELEPHONE ENCOUNTER
----- Message from Vanessa Gonzalez sent at 3/2/2022  9:58 AM CST -----  Needs advice from nurse:      Who Called:mom-María Gregg  Regarding:patient received stitches on his back on 2/27 and will need to have them removed 12-14 days preferably anytime after 2:30 pm  Would the patient rather a call back or [facility]IA [facility]?  Best Call Back number:      12 days will be march 11 Friday      Ok to schedule him after 230 on Friday or wait until Monday?

## 2022-03-11 ENCOUNTER — OFFICE VISIT (OUTPATIENT)
Dept: FAMILY MEDICINE | Facility: CLINIC | Age: 22
End: 2022-03-11
Payer: MEDICAID

## 2022-03-11 VITALS
TEMPERATURE: 98 F | OXYGEN SATURATION: 97 % | HEIGHT: 69 IN | BODY MASS INDEX: 46.65 KG/M2 | WEIGHT: 315 LBS | HEART RATE: 94 BPM | SYSTOLIC BLOOD PRESSURE: 120 MMHG | DIASTOLIC BLOOD PRESSURE: 60 MMHG

## 2022-03-11 DIAGNOSIS — Z48.02 VISIT FOR SUTURE REMOVAL: Primary | ICD-10-CM

## 2022-03-11 PROCEDURE — 99212 OFFICE O/P EST SF 10 MIN: CPT | Mod: S$GLB,,, | Performed by: FAMILY MEDICINE

## 2022-03-11 PROCEDURE — 3078F PR MOST RECENT DIASTOLIC BLOOD PRESSURE < 80 MM HG: ICD-10-PCS | Mod: CPTII,S$GLB,, | Performed by: FAMILY MEDICINE

## 2022-03-11 PROCEDURE — 3074F SYST BP LT 130 MM HG: CPT | Mod: CPTII,S$GLB,, | Performed by: FAMILY MEDICINE

## 2022-03-11 PROCEDURE — 3008F BODY MASS INDEX DOCD: CPT | Mod: CPTII,S$GLB,, | Performed by: FAMILY MEDICINE

## 2022-03-11 PROCEDURE — 1159F MED LIST DOCD IN RCRD: CPT | Mod: CPTII,S$GLB,, | Performed by: FAMILY MEDICINE

## 2022-03-11 PROCEDURE — 3008F PR BODY MASS INDEX (BMI) DOCUMENTED: ICD-10-PCS | Mod: CPTII,S$GLB,, | Performed by: FAMILY MEDICINE

## 2022-03-11 PROCEDURE — 1159F PR MEDICATION LIST DOCUMENTED IN MEDICAL RECORD: ICD-10-PCS | Mod: CPTII,S$GLB,, | Performed by: FAMILY MEDICINE

## 2022-03-11 PROCEDURE — 3074F PR MOST RECENT SYSTOLIC BLOOD PRESSURE < 130 MM HG: ICD-10-PCS | Mod: CPTII,S$GLB,, | Performed by: FAMILY MEDICINE

## 2022-03-11 PROCEDURE — 3078F DIAST BP <80 MM HG: CPT | Mod: CPTII,S$GLB,, | Performed by: FAMILY MEDICINE

## 2022-03-11 PROCEDURE — 99212 PR OFFICE/OUTPT VISIT, EST, LEVL II, 10-19 MIN: ICD-10-PCS | Mod: S$GLB,,, | Performed by: FAMILY MEDICINE

## 2022-03-11 NOTE — PROGRESS NOTES
" Patient ID: James Loya is a 21 y.o. male.    Chief Complaint: Suture / Staple Removal    HPI       James Loya is a 21 y.o. male here for removal of sutures.  Patient with fall from bed of a truck onto his back proximally two weeks ago.  Here for suture removal.  No complaints at this time.      Review of Symptoms      /60 (BP Location: Left arm, Patient Position: Sitting)   Pulse 94   Temp 98.3 °F (36.8 °C) (Oral)   Ht 5' 9" (1.753 m)   Wt (!) 181 kg (398 lb 14.8 oz)   SpO2 97%   BMI 58.91 kg/m²     Physical Exam    Vitals:    03/11/22 1449   BP: 120/60   BP Location: Left arm   Patient Position: Sitting   Pulse: 94   Temp: 98.3 °F (36.8 °C)   TempSrc: Oral   SpO2: 97%   Weight: (!) 181 kg (398 lb 14.8 oz)   Height: 5' 9" (1.753 m)       Constitutional:   Oriented to person, place, and time.appears well-developed and well-nourished.   No distress.      HENT  Head: Normocephalic and atraumatic  Right Ear: External ear normal.   Left Ear: External ear normal.   Nose: External nose normal.   Mouth: Moist mucous membranes    Eyes:   Conjunctivae are normal. Right eye exhibits no discharge. Left eye exhibits no discharge. No scleral icterus. No periorbital edema    Musculoskeletal:  No edema. No obvious deformity No wasting     Neurological:  Alert and oriented to person, place, and time. Coordination normal.     Skin:   Skin is warm and dry.  No diaphoresis.   No rash noted.   Several sutures almost covered with scab-removed the without much problems.  Tolerated well      Psychiatric: Normal mood and affect. Behavior is normal. Judgment and thought content normal.     Complete Blood Count  Lab Results   Component Value Date    RBC 4.63 05/28/2020    HGB 15.2 05/28/2020    HCT 44.5 05/28/2020    MCV 96 05/28/2020    MCH 32.8 (H) 05/28/2020    MCHC 34.2 05/28/2020    RDW 11.7 05/28/2020     05/28/2020    MPV 11.2 05/28/2020    GRAN 4.1 05/28/2020    GRAN 54.1 05/28/2020    LYMPH " 2.6 05/28/2020    LYMPH 34.6 05/28/2020    MONO 0.6 05/28/2020    MONO 8.4 05/28/2020    EOS 0.2 05/28/2020    BASO 0.04 05/28/2020    EOSINOPHIL 2.1 05/28/2020    BASOPHIL 0.5 05/28/2020    DIFFMETHOD Automated 05/28/2020       Comprehensive Metabolic Panel  No results found for: GLU, BUN, CREATININE, NA, K, CL, PROT, ALBUMIN, BILITOT, AST, ALKPHOS, CO2, ALT, ANIONGAP, EGFRNONAA, ESTGFRAFRICA    TSH  No results found for: TSH    Assessment / Plan:    No diagnosis found.  There are no diagnoses linked to this encounter.

## 2022-04-14 RX ORDER — METHYLPHENIDATE HYDROCHLORIDE 54 MG/1
TABLET ORAL
Qty: 30 TABLET | Refills: 0 | Status: SHIPPED | OUTPATIENT
Start: 2022-04-14 | End: 2022-05-12 | Stop reason: SDUPTHER

## 2022-04-14 NOTE — TELEPHONE ENCOUNTER
No new care gaps identified.  Powered by [employer] by "[employer]. Reference number: [id number].   4/14/2022 12:57:24 PM CDT

## 2022-05-12 NOTE — TELEPHONE ENCOUNTER
No new care gaps identified.  [facility] Embedded Care Gaps. Reference number: [id number]. 5/12/2022   10:09:09 AM HUGH

## 2022-05-13 RX ORDER — METHYLPHENIDATE HYDROCHLORIDE 54 MG/1
54 TABLET ORAL EVERY MORNING
Qty: 30 TABLET | Refills: 0 | Status: SHIPPED | OUTPATIENT
Start: 2022-05-13 | End: 2022-06-14 | Stop reason: SDUPTHER

## 2022-06-21 ENCOUNTER — OFFICE VISIT (OUTPATIENT)
Dept: FAMILY MEDICINE | Facility: CLINIC | Age: 22
End: 2022-06-21
Payer: MEDICAID

## 2022-06-21 VITALS
WEIGHT: 315 LBS | SYSTOLIC BLOOD PRESSURE: 130 MMHG | BODY MASS INDEX: 46.65 KG/M2 | TEMPERATURE: 98 F | OXYGEN SATURATION: 97 % | HEIGHT: 69 IN | DIASTOLIC BLOOD PRESSURE: 70 MMHG | HEART RATE: 99 BPM

## 2022-06-21 DIAGNOSIS — Z00.00 ROUTINE HEALTH MAINTENANCE: Primary | ICD-10-CM

## 2022-06-21 PROCEDURE — 3078F PR MOST RECENT DIASTOLIC BLOOD PRESSURE < 80 MM HG: ICD-10-PCS | Mod: CPTII,S$GLB,, | Performed by: FAMILY MEDICINE

## 2022-06-21 PROCEDURE — 3008F BODY MASS INDEX DOCD: CPT | Mod: CPTII,S$GLB,, | Performed by: FAMILY MEDICINE

## 2022-06-21 PROCEDURE — 3078F DIAST BP <80 MM HG: CPT | Mod: CPTII,S$GLB,, | Performed by: FAMILY MEDICINE

## 2022-06-21 PROCEDURE — 99395 PR PREVENTIVE VISIT,EST,18-39: ICD-10-PCS | Mod: S$GLB,,, | Performed by: FAMILY MEDICINE

## 2022-06-21 PROCEDURE — 99395 PREV VISIT EST AGE 18-39: CPT | Mod: S$GLB,,, | Performed by: FAMILY MEDICINE

## 2022-06-21 PROCEDURE — 3075F PR MOST RECENT SYSTOLIC BLOOD PRESS GE 130-139MM HG: ICD-10-PCS | Mod: CPTII,S$GLB,, | Performed by: FAMILY MEDICINE

## 2022-06-21 PROCEDURE — 3075F SYST BP GE 130 - 139MM HG: CPT | Mod: CPTII,S$GLB,, | Performed by: FAMILY MEDICINE

## 2022-06-21 PROCEDURE — 1159F MED LIST DOCD IN RCRD: CPT | Mod: CPTII,S$GLB,, | Performed by: FAMILY MEDICINE

## 2022-06-21 PROCEDURE — 3008F PR BODY MASS INDEX (BMI) DOCUMENTED: ICD-10-PCS | Mod: CPTII,S$GLB,, | Performed by: FAMILY MEDICINE

## 2022-06-21 PROCEDURE — 1159F PR MEDICATION LIST DOCUMENTED IN MEDICAL RECORD: ICD-10-PCS | Mod: CPTII,S$GLB,, | Performed by: FAMILY MEDICINE

## 2022-06-21 RX ORDER — METHYLPHENIDATE HYDROCHLORIDE 54 MG/1
54 TABLET ORAL EVERY MORNING
Qty: 30 TABLET | Refills: 0 | Status: SHIPPED | OUTPATIENT
Start: 2022-09-12 | End: 2022-12-22

## 2022-06-21 RX ORDER — METHYLPHENIDATE HYDROCHLORIDE 54 MG/1
54 TABLET ORAL EVERY MORNING
Qty: 30 TABLET | Refills: 0 | Status: SHIPPED | OUTPATIENT
Start: 2022-08-13 | End: 2022-10-06 | Stop reason: SDUPTHER

## 2022-06-21 RX ORDER — METHYLPHENIDATE HYDROCHLORIDE 54 MG/1
54 TABLET ORAL EVERY MORNING
Qty: 30 TABLET | Refills: 0 | Status: SHIPPED | OUTPATIENT
Start: 2022-07-13 | End: 2022-08-10 | Stop reason: SDUPTHER

## 2022-06-21 NOTE — LETTER
June 21, 2022    James Loya  [address] [address]         [facility]  [address] [address]  Phone: [phone]  Fax: [phone] June 21, 2022     Patient: James Loya   YOB: 2000   Date of Visit: 6/21/2022       To Whom It May Concern:    It is my medical opinion that James Loya may return to work on today. He was seen in our clinic today.    If you have any questions or concerns, please don't hesitate to call.    Sincerely,        Arnie Foster MD

## 2022-06-21 NOTE — PROGRESS NOTES
" Patient ID: James Loya is a 21 y.o. male.    Chief Complaint: Follow-up    HPI      James Loya is a 21 y.o. male     Vitals:    06/21/22 0832   BP: 130/70   BP Location: Left arm   Patient Position: Sitting   Pulse: 99   Temp: 98 °F (36.7 °C)   TempSrc: Oral   SpO2: 97%   Weight: (!) 176.3 kg (388 lb 10.7 oz)   Height: 5' 9" (1.753 m)            Review of Symptoms      Physical Exam    Constitutional:  Oriented to person, place, and time.appears well-developed and well-nourished.  No distress.      HENT  Head: Normocephalic and atraumatic  Right Ear: External ear normal.   Left Ear: External ear normal.   Nose: External nose normal.   Mouth:  Moist mucus membranes.    Eyes:  Conjunctivae are normal. Right eye exhibits no discharge.  Left eye exhibits no discharge. No scleral icterus.  No periorbital edema    Cardiovascular:  Regular rate and rhythm with normal S1 and S2     Pulmonary/Chest:   Clear to auscultation bilaterally without wheezes, rhonchi or rales      Musculoskeletal:  No edema. No obvious deformity No wasting       Neurological:  Alert and oriented to person, place, and time.   Coordination normal.     Skin:   Skin is warm and dry.  No diaphoresis.   No rash noted.     Psychiatric: Normal mood and affect. Behavior is normal.  Judgment and thought content normal.     Complete Blood Count  Lab Results   Component Value Date    RBC 4.63 05/28/2020    HGB 15.2 05/28/2020    HCT 44.5 05/28/2020    MCV 96 05/28/2020    MCH 32.8 (H) 05/28/2020    MCHC 34.2 05/28/2020    RDW 11.7 05/28/2020     05/28/2020    MPV 11.2 05/28/2020    GRAN 4.1 05/28/2020    GRAN 54.1 05/28/2020    LYMPH 2.6 05/28/2020    LYMPH 34.6 05/28/2020    MONO 0.6 05/28/2020    MONO 8.4 05/28/2020    EOS 0.2 05/28/2020    BASO 0.04 05/28/2020    EOSINOPHIL 2.1 05/28/2020    BASOPHIL 0.5 05/28/2020    DIFFMETHOD Automated 05/28/2020       Comprehensive Metabolic Panel  No results found for: GLU, BUN, " CREATININE, NA, K, CL, PROT, ALBUMIN, BILITOT, AST, ALKPHOS, CO2, ALT, ANIONGAP, EGFRNONAA, ESTGFRAFRICA    TSH  No results found for: TSH    Assessment / Plan:      ICD-10-CM ICD-9-CM   1. Routine health maintenance  Z00.00 V70.0     Routine health maintenance      Doing well at this time and will continue medication

## 2022-08-10 RX ORDER — METHYLPHENIDATE HYDROCHLORIDE 54 MG/1
54 TABLET ORAL EVERY MORNING
Qty: 30 TABLET | Refills: 0 | Status: SHIPPED | OUTPATIENT
Start: 2022-08-10 | End: 2022-12-22

## 2022-08-10 NOTE — TELEPHONE ENCOUNTER
No new care gaps identified.  [facility] Embedded Care Gaps. Reference number: [id number]. 8/10/2022   9:03:58 AM SHAKAT

## 2022-10-01 ENCOUNTER — NURSE TRIAGE (OUTPATIENT)
Dept: ADMINISTRATIVE | Facility: CLINIC | Age: 22
End: 2022-10-01
Payer: MEDICAID

## 2022-10-01 ENCOUNTER — PATIENT MESSAGE (OUTPATIENT)
Dept: FAMILY MEDICINE | Facility: CLINIC | Age: 22
End: 2022-10-01
Payer: MEDICAID

## 2022-10-02 NOTE — TELEPHONE ENCOUNTER
James is calling with c/o being sick with vomiting diarrhea and stomach pain. He states he now needs a return to work slip. I have advised that he will have to reach his PCP during normal clinic hours or go to [facility] is he continues to be ill. He state she will go to [facility] tomorrow.    Reason for Disposition   [1] Caller requesting NON-URGENT health information AND [2] PCP's office is the best resource    Additional Information   Negative: [1] Caller is not with the adult (patient) AND [2] reporting urgent symptoms   Negative: Lab result questions   Negative: Medication questions   Negative: Caller can't be reached by phone   Negative: Caller has already spoken to PCP or another triager   Negative: RN needs further essential information from caller in order to complete triage   Negative: Requesting regular office appointment    Protocols used: Information Only Call - No Triage-A-

## 2022-10-03 ENCOUNTER — PATIENT MESSAGE (OUTPATIENT)
Dept: FAMILY MEDICINE | Facility: CLINIC | Age: 22
End: 2022-10-03
Payer: MEDICAID

## 2022-10-06 RX ORDER — METHYLPHENIDATE HYDROCHLORIDE 54 MG/1
54 TABLET ORAL EVERY MORNING
Qty: 30 TABLET | Refills: 0 | Status: SHIPPED | OUTPATIENT
Start: 2022-10-06 | End: 2022-11-15 | Stop reason: SDUPTHER

## 2022-10-06 NOTE — TELEPHONE ENCOUNTER
No new care gaps identified.  [facility] Embedded Care Gaps. Reference number: [id number]. 10/06/2022   9:10:33 AM SHAKAT

## 2022-10-06 NOTE — TELEPHONE ENCOUNTER
----- Message from Parul Anderson sent at 10/6/2022  9:05 AM CDT -----  Regarding: Rx refill  Received refill request via fax from [employer] [facility] for  methylphenidate HCl 54 MG CR tablet

## 2022-11-15 RX ORDER — METHYLPHENIDATE HYDROCHLORIDE 54 MG/1
54 TABLET ORAL EVERY MORNING
Qty: 30 TABLET | Refills: 0 | Status: SHIPPED | OUTPATIENT
Start: 2022-11-15 | End: 2022-12-16 | Stop reason: SDUPTHER

## 2022-11-15 NOTE — TELEPHONE ENCOUNTER
No new care gaps identified.  [facility] Embedded Care Gaps. Reference number: [id number]. 11/15/2022   9:33:05 AM CST

## 2022-12-15 ENCOUNTER — TELEPHONE (OUTPATIENT)
Dept: FAMILY MEDICINE | Facility: CLINIC | Age: 22
End: 2022-12-15
Payer: MEDICAID

## 2022-12-15 NOTE — TELEPHONE ENCOUNTER
----- Message from Maty Tracy sent at 12/15/2022 10:19 AM CST -----  Regarding: earlier appoinment request  Name of Who is Calling:PRECIOUS QUEEN [[id number]]          What is the request in detail:earlier appoinment request           Can the clinic reply by [facility]: no           [facility][phone] (home)

## 2022-12-15 NOTE — TELEPHONE ENCOUNTER
----- Message from Victorina Pagan Patient Care Assistant sent at 12/15/2022  3:07 PM CST -----  Type:  Patient Returning Call    Who Called:pt  Who Left Message for Patient: office  Does the patient know what this is regarding?: yes  Would the patient rather a call back or a response via [facility]?  Call   Best Call Back Number: [phone]   Additional Information:

## 2022-12-16 NOTE — TELEPHONE ENCOUNTER
Spoke with pt's mother. Appt has been flipped to virtual, per okay by Dr. Bradford. I expressed the importance of exercising patience while awaiting virtual visit.

## 2022-12-22 ENCOUNTER — OFFICE VISIT (OUTPATIENT)
Dept: FAMILY MEDICINE | Facility: CLINIC | Age: 22
End: 2022-12-22
Payer: MEDICAID

## 2022-12-22 ENCOUNTER — TELEPHONE (OUTPATIENT)
Dept: FAMILY MEDICINE | Facility: CLINIC | Age: 22
End: 2022-12-22

## 2022-12-22 DIAGNOSIS — F98.8 ATTENTION DEFICIT DISORDER, UNSPECIFIED HYPERACTIVITY PRESENCE: Primary | ICD-10-CM

## 2022-12-22 PROCEDURE — 99213 PR OFFICE/OUTPT VISIT, EST, LEVL III, 20-29 MIN: ICD-10-PCS | Mod: 95,,, | Performed by: FAMILY MEDICINE

## 2022-12-22 PROCEDURE — 99213 OFFICE O/P EST LOW 20 MIN: CPT | Mod: 95,,, | Performed by: FAMILY MEDICINE

## 2022-12-22 RX ORDER — METHYLPHENIDATE HYDROCHLORIDE 54 MG/1
54 TABLET ORAL EVERY MORNING
Qty: 30 TABLET | Refills: 0 | Status: SHIPPED | OUTPATIENT
Start: 2023-01-14 | End: 2023-01-14 | Stop reason: SDUPTHER

## 2022-12-22 RX ORDER — METHYLPHENIDATE HYDROCHLORIDE 54 MG/1
54 TABLET ORAL EVERY MORNING
Qty: 30 TABLET | Refills: 0 | Status: SHIPPED | OUTPATIENT
Start: 2023-03-16 | End: 2023-07-10

## 2022-12-22 RX ORDER — METHYLPHENIDATE HYDROCHLORIDE 54 MG/1
54 TABLET ORAL EVERY MORNING
Qty: 30 TABLET | Refills: 0 | Status: SHIPPED | OUTPATIENT
Start: 2023-02-14 | End: 2023-07-10

## 2022-12-22 NOTE — PROGRESS NOTES
Patient ID: James Loya is a 22 y.o. male.    Chief Complaint: Follow-up    HPI       James Loya is a 22 y.o. male who I follow for ADHD.  Doing well on current medication.  No problems.  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  Having success doing this.  No complaints of tachycardia or irritability associated with the medication.    Review of Symptoms    Constitutional  No change in activity, No chills fever   Resp  Neg hemoptysis, stridor, choking  CVS  Neg chest pain, palpitations    There were no vitals taken for this visit.    Physical Exam    There were no vitals filed for this visit.    Constitutional:   Oriented to person, place, and time.appears well-developed and well-nourished.   No distress.        Psychiatric: Normal mood and affect. Behavior is normal. Judgment and thought content normal.     Complete Blood Count  Lab Results   Component Value Date    RBC 4.63 05/28/2020    HGB 15.2 05/28/2020    HCT 44.5 05/28/2020    MCV 96 05/28/2020    MCH 32.8 (H) 05/28/2020    MCHC 34.2 05/28/2020    RDW 11.7 05/28/2020     05/28/2020    MPV 11.2 05/28/2020    GRAN 4.1 05/28/2020    GRAN 54.1 05/28/2020    LYMPH 2.6 05/28/2020    LYMPH 34.6 05/28/2020    MONO 0.6 05/28/2020    MONO 8.4 05/28/2020    EOS 0.2 05/28/2020    BASO 0.04 05/28/2020    EOSINOPHIL 2.1 05/28/2020    BASOPHIL 0.5 05/28/2020    DIFFMETHOD Automated 05/28/2020       Comprehensive Metabolic Panel  No results found for: GLU, BUN, CREATININE, NA, K, CL, PROT, ALBUMIN, BILITOT, AST, ALKPHOS, CO2, ALT, ANIONGAP, EGFRNONAA, ESTGFRAFRICA    TSH  No results found for: TSH    Assessment / Plan:      ICD-10-CM ICD-9-CM   1. Attention deficit disorder, unspecified hyperactivity presence  F98.8 314.00     Attention deficit disorder, unspecified hyperactivity presence    Other orders  -     methylphenidate HCl 54 MG CR tablet; Take 1 tablet (54 mg total) by mouth every morning.  Dispense: 30 tablet;  Refill: 0  -     methylphenidate HCl 54 MG CR tablet; Take 1 tablet (54 mg total) by mouth every morning.  Dispense: 30 tablet; Refill: 0  -     methylphenidate HCl 54 MG CR tablet; Take 1 tablet (54 mg total) by mouth every morning.  Dispense: 30 tablet; Refill: 0        The patient location is:  Work  The chief complaint leading to consultation is:  ADHD  Visit type: Virtual visit with synchronous audio and video  Total time spent with patient:  10 minutes  Each patient to whom he or she provides medical services by telemedicine is:  (1) informed of the relationship between the physician and patient and the respective role of any other health care provider with respect to management of the patient; and (2) notified that he or she may decline to receive medical services by telemedicine and may withdraw from such care at any time.    Discussed make an appointment in six months for well visit.  Typically his mother does this for him.  Requested that he tell his mother to make the appointment through the portal.Answers submitted by the patient for this visit:  Review of Systems Questionnaire (Submitted on 12/21/2022)  activity change: No  unexpected weight change: No  neck pain: No  hearing loss: No  rhinorrhea: No  trouble swallowing: No  eye discharge: No  visual disturbance: No  chest tightness: No  wheezing: No  chest pain: No  palpitations: No  blood in stool: No  constipation: No  vomiting: No  diarrhea: No  polydipsia: No  polyuria: No  difficulty urinating: No  urgency: No  hematuria: No  joint swelling: No  arthralgias: No  headaches: No  weakness: No  confusion: No  dysphoric mood: No

## 2023-03-15 RX ORDER — METHYLPHENIDATE HYDROCHLORIDE 54 MG/1
54 TABLET ORAL EVERY MORNING
Qty: 30 TABLET | Refills: 0 | Status: SHIPPED | OUTPATIENT
Start: 2023-03-15 | End: 2023-04-13 | Stop reason: SDUPTHER

## 2023-03-15 NOTE — TELEPHONE ENCOUNTER
No new care gaps identified.  [facility] Embedded Care Gaps. Reference number: [id number]. 3/14/2023   8:49:16 PM CDT

## 2023-04-13 RX ORDER — METHYLPHENIDATE HYDROCHLORIDE 54 MG/1
54 TABLET ORAL EVERY MORNING
Qty: 30 TABLET | Refills: 0 | Status: SHIPPED | OUTPATIENT
Start: 2023-04-13 | End: 2023-05-14 | Stop reason: SDUPTHER

## 2023-04-13 NOTE — TELEPHONE ENCOUNTER
No new care gaps identified.  [facility] Embedded Care Gaps. Reference number: [id number]. 4/13/2023   4:55:45 PM CDT

## 2023-05-14 RX ORDER — METHYLPHENIDATE HYDROCHLORIDE 54 MG/1
54 TABLET ORAL EVERY MORNING
Qty: 30 TABLET | Refills: 0 | Status: SHIPPED | OUTPATIENT
Start: 2023-05-14 | End: 2023-05-16 | Stop reason: SDUPTHER

## 2023-05-14 NOTE — TELEPHONE ENCOUNTER
No care due was identified.  Health [facility] Embedded Care Due Messages. Reference number: [id number].   5/14/2023 11:49:57 AM CDT

## 2023-05-16 ENCOUNTER — PATIENT MESSAGE (OUTPATIENT)
Dept: FAMILY MEDICINE | Facility: CLINIC | Age: 23
End: 2023-05-16
Payer: MEDICAID

## 2023-05-16 DIAGNOSIS — Z00.00 ROUTINE HEALTH MAINTENANCE: Primary | ICD-10-CM

## 2023-05-16 RX ORDER — METHYLPHENIDATE HYDROCHLORIDE 54 MG/1
54 TABLET ORAL EVERY MORNING
Qty: 30 TABLET | Refills: 0 | Status: SHIPPED | OUTPATIENT
Start: 2023-05-16 | End: 2023-05-18 | Stop reason: SDUPTHER

## 2023-05-16 NOTE — TELEPHONE ENCOUNTER
No care due was identified.  Health [facility] Embedded Care Due Messages. Reference number: [id number].   5/16/2023 4:31:10 PM CDT

## 2023-05-18 ENCOUNTER — PATIENT MESSAGE (OUTPATIENT)
Dept: FAMILY MEDICINE | Facility: CLINIC | Age: 23
End: 2023-05-18
Payer: MEDICAID

## 2023-05-18 DIAGNOSIS — Z00.00 ROUTINE HEALTH MAINTENANCE: ICD-10-CM

## 2023-05-19 RX ORDER — METHYLPHENIDATE HYDROCHLORIDE 54 MG/1
54 TABLET ORAL EVERY MORNING
Qty: 30 TABLET | Refills: 0 | Status: SHIPPED | OUTPATIENT
Start: 2023-05-19 | End: 2023-06-13 | Stop reason: SDUPTHER

## 2023-05-19 RX ORDER — METHYLPHENIDATE HYDROCHLORIDE 54 MG/1
54 TABLET ORAL EVERY MORNING
Qty: 30 TABLET | Refills: 0 | OUTPATIENT
Start: 2023-05-19

## 2023-05-19 NOTE — TELEPHONE ENCOUNTER
No care due was identified.  [facility] Embedded Care Due Messages. Reference number: [id number].   5/18/2023 9:06:59 PM CDT

## 2023-05-19 NOTE — TELEPHONE ENCOUNTER
No care due was identified.  [facility] Embedded Care Due Messages. Reference number: [id number].   5/19/2023 7:23:48 AM CDT

## 2023-06-13 DIAGNOSIS — Z00.00 ROUTINE HEALTH MAINTENANCE: ICD-10-CM

## 2023-06-13 NOTE — TELEPHONE ENCOUNTER
No care due was identified.  [facility] Embedded Care Due Messages. Reference number: [id number].   6/13/2023 6:03:33 PM CDT

## 2023-06-14 RX ORDER — METHYLPHENIDATE HYDROCHLORIDE 54 MG/1
54 TABLET ORAL EVERY MORNING
Qty: 30 TABLET | Refills: 0 | Status: SHIPPED | OUTPATIENT
Start: 2023-06-14 | End: 2023-07-10 | Stop reason: SDUPTHER

## 2023-07-10 ENCOUNTER — OFFICE VISIT (OUTPATIENT)
Dept: FAMILY MEDICINE | Facility: CLINIC | Age: 23
End: 2023-07-10
Payer: MEDICAID

## 2023-07-10 VITALS
TEMPERATURE: 98 F | OXYGEN SATURATION: 96 % | HEIGHT: 69 IN | HEART RATE: 99 BPM | DIASTOLIC BLOOD PRESSURE: 82 MMHG | SYSTOLIC BLOOD PRESSURE: 124 MMHG | BODY MASS INDEX: 46.65 KG/M2 | WEIGHT: 315 LBS

## 2023-07-10 DIAGNOSIS — Z00.00 ROUTINE HEALTH MAINTENANCE: ICD-10-CM

## 2023-07-10 DIAGNOSIS — F98.8 ATTENTION DEFICIT DISORDER, UNSPECIFIED HYPERACTIVITY PRESENCE: Primary | ICD-10-CM

## 2023-07-10 PROCEDURE — 3074F SYST BP LT 130 MM HG: CPT | Mod: CPTII,S$GLB,, | Performed by: FAMILY MEDICINE

## 2023-07-10 PROCEDURE — 99395 PREV VISIT EST AGE 18-39: CPT | Mod: S$GLB,,, | Performed by: FAMILY MEDICINE

## 2023-07-10 PROCEDURE — 3079F DIAST BP 80-89 MM HG: CPT | Mod: CPTII,S$GLB,, | Performed by: FAMILY MEDICINE

## 2023-07-10 PROCEDURE — 1159F MED LIST DOCD IN RCRD: CPT | Mod: CPTII,S$GLB,, | Performed by: FAMILY MEDICINE

## 2023-07-10 PROCEDURE — 3008F BODY MASS INDEX DOCD: CPT | Mod: CPTII,S$GLB,, | Performed by: FAMILY MEDICINE

## 2023-07-10 PROCEDURE — 3074F PR MOST RECENT SYSTOLIC BLOOD PRESSURE < 130 MM HG: ICD-10-PCS | Mod: CPTII,S$GLB,, | Performed by: FAMILY MEDICINE

## 2023-07-10 PROCEDURE — 3008F PR BODY MASS INDEX (BMI) DOCUMENTED: ICD-10-PCS | Mod: CPTII,S$GLB,, | Performed by: FAMILY MEDICINE

## 2023-07-10 PROCEDURE — 1159F PR MEDICATION LIST DOCUMENTED IN MEDICAL RECORD: ICD-10-PCS | Mod: CPTII,S$GLB,, | Performed by: FAMILY MEDICINE

## 2023-07-10 PROCEDURE — 99395 PR PREVENTIVE VISIT,EST,18-39: ICD-10-PCS | Mod: S$GLB,,, | Performed by: FAMILY MEDICINE

## 2023-07-10 PROCEDURE — 3079F PR MOST RECENT DIASTOLIC BLOOD PRESSURE 80-89 MM HG: ICD-10-PCS | Mod: CPTII,S$GLB,, | Performed by: FAMILY MEDICINE

## 2023-07-10 RX ORDER — METHYLPHENIDATE HYDROCHLORIDE 54 MG/1
54 TABLET ORAL EVERY MORNING
Qty: 30 TABLET | Refills: 0 | Status: SHIPPED | OUTPATIENT
Start: 2023-08-09 | End: 2023-08-04

## 2023-07-10 RX ORDER — CETIRIZINE HYDROCHLORIDE 10 MG/1
10 TABLET ORAL DAILY PRN
COMMUNITY
Start: 2023-06-23 | End: 2023-07-10 | Stop reason: SDUPTHER

## 2023-07-10 RX ORDER — METHYLPHENIDATE HYDROCHLORIDE 54 MG/1
54 TABLET ORAL EVERY MORNING
Qty: 30 TABLET | Refills: 0 | Status: SHIPPED | OUTPATIENT
Start: 2023-07-10 | End: 2023-07-26 | Stop reason: SDUPTHER

## 2023-07-10 RX ORDER — METHYLPHENIDATE HYDROCHLORIDE 54 MG/1
54 TABLET ORAL EVERY MORNING
Qty: 30 TABLET | Refills: 0 | Status: SHIPPED | OUTPATIENT
Start: 2023-09-08 | End: 2023-08-04

## 2023-07-10 RX ORDER — CETIRIZINE HYDROCHLORIDE 10 MG/1
10 TABLET ORAL DAILY PRN
Qty: 90 TABLET | Refills: 3 | Status: SHIPPED | OUTPATIENT
Start: 2023-07-10

## 2023-07-10 NOTE — PROGRESS NOTES
" Patient ID: James Loya is a 23 y.o. male.    Chief Complaint: Follow-up    HPI      James Loya is a 23 y.o. male here for follow-up regarding ADD.  Using methylphenidate 54 milligrams daily.  Doing well on medication no new problems.  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  No new problems or concerns.    Recently had allergic rhinitis and was given Zyrtec which work very well form.    Weight-mother has been talking about weight including weight loss surgery.  Discussed weight that he can not get any larger.  Needs to exercise and eat correctly.  Tried keto diet but that is not for him     Vitals:    07/10/23 1019   BP: 124/82   BP Location: Left arm   Patient Position: Sitting   Pulse: 99   Temp: 98.2 °F (36.8 °C)   TempSrc: Oral   SpO2: 96%   Weight: (!) 191.7 kg (422 lb 8.2 oz)   Height: 5' 9" (1.753 m)            Review of Symptoms      Physical Exam    Constitutional:  Oriented to person, place, and time.appears well-developed and well-nourished.  No distress.      HENT  Head: Normocephalic and atraumatic  Right Ear: External ear normal.   Left Ear: External ear normal.   Nose: External nose normal.   Mouth:  Moist mucus membranes.    Eyes:  Conjunctivae are normal. Right eye exhibits no discharge.  Left eye exhibits no discharge. No scleral icterus.  No periorbital edema    Cardiovascular:  Regular rate and rhythm with normal S1 and S2     Pulmonary/Chest:   Clear to auscultation bilaterally without wheezes, rhonchi or rales      Musculoskeletal:  No edema. No obvious deformity No wasting       Neurological:  Alert and oriented to person, place, and time.   Coordination normal.     Skin:   Skin is warm and dry.  No diaphoresis.   No rash noted.     Psychiatric: Normal mood and affect. Behavior is normal.  Judgment and thought content normal.     Complete Blood Count  Lab Results   Component Value Date    RBC 4.63 05/28/2020    HGB 15.2 " 05/28/2020    HCT 44.5 05/28/2020    MCV 96 05/28/2020    MCH 32.8 (H) 05/28/2020    MCHC 34.2 05/28/2020    RDW 11.7 05/28/2020     05/28/2020    MPV 11.2 05/28/2020    GRAN 4.1 05/28/2020    GRAN 54.1 05/28/2020    LYMPH 2.6 05/28/2020    LYMPH 34.6 05/28/2020    MONO 0.6 05/28/2020    MONO 8.4 05/28/2020    EOS 0.2 05/28/2020    BASO 0.04 05/28/2020    EOSINOPHIL 2.1 05/28/2020    BASOPHIL 0.5 05/28/2020    DIFFMETHOD Automated 05/28/2020       Comprehensive Metabolic Panel  No results found for: GLU, BUN, CREATININE, NA, K, CL, PROT, ALBUMIN, BILITOT, AST, ALKPHOS, CO2, ALT, ANIONGAP, EGFRNONAA, ESTGFRAFRICA    TSH  No results found for: TSH    Assessment / Plan:      ICD-10-CM ICD-9-CM   1. Routine health maintenance  Z00.00 V70.0     Routine health maintenance    Other orders  -     cetirizine (ZYRTEC) 10 MG tablet; Take 1 tablet (10 mg total) by mouth daily as needed for Allergies.  Dispense: 90 tablet; Refill: 3  -     methylphenidate HCl 54 MG CR tablet; Take 1 tablet (54 mg total) by mouth every morning.  Dispense: 30 tablet; Refill: 0  -     methylphenidate HCl 54 MG CR tablet; Take 1 tablet (54 mg total) by mouth every morning.  Dispense: 30 tablet; Refill: 0  -     methylphenidate HCl 54 MG CR tablet; Take 1 tablet (54 mg total) by mouth every morning.  Dispense: 30 tablet; Refill: 0

## 2023-07-24 ENCOUNTER — PATIENT MESSAGE (OUTPATIENT)
Dept: FAMILY MEDICINE | Facility: CLINIC | Age: 23
End: 2023-07-24
Payer: MEDICAID

## 2023-07-24 DIAGNOSIS — F98.8 ATTENTION DEFICIT DISORDER, UNSPECIFIED HYPERACTIVITY PRESENCE: Primary | ICD-10-CM

## 2023-07-26 RX ORDER — METHYLPHENIDATE HYDROCHLORIDE 54 MG/1
54 TABLET ORAL EVERY MORNING
Qty: 30 TABLET | Refills: 0 | Status: SHIPPED | OUTPATIENT
Start: 2023-07-26 | End: 2023-08-04

## 2023-08-02 ENCOUNTER — TELEPHONE (OUTPATIENT)
Dept: FAMILY MEDICINE | Facility: CLINIC | Age: 23
End: 2023-08-02
Payer: MEDICAID

## 2023-08-02 DIAGNOSIS — F98.8 ATTENTION DEFICIT DISORDER, UNSPECIFIED HYPERACTIVITY PRESENCE: ICD-10-CM

## 2023-08-02 NOTE — TELEPHONE ENCOUNTER
F 98.8  is the diagnosis code please call and tell the pharmacy     Please tell Jaquelin  In regards to the request a tel the pharmacy the diagnoses-I sent in a new prescription with the diagnosis attached.  I figured that would be sufficient

## 2023-08-02 NOTE — TELEPHONE ENCOUNTER
----- Message from Yashira Chew sent at 8/2/2023  2:03 PM CDT -----  Regarding: rx issue  Contact: [phone]/sanjay Hammer  Patient's mom Jaquelin is requesting a call back regarding a diagnosis code was never sent to the pharmacy for the  methylphenidate HCl 54 MG CR tablets.  Patient has not had medication since 07/13     Would the patient rather a call back or a response via [facility]?    Best Call Back Number:  [phone]/sanjay Hammer  Additional Information:  [facility]/PHARMACY #[id number] - [place] - [address] AT [address]

## 2023-08-03 ENCOUNTER — TELEPHONE (OUTPATIENT)
Dept: FAMILY MEDICINE | Facility: CLINIC | Age: 23
End: 2023-08-03
Payer: MEDICAID

## 2023-08-03 NOTE — TELEPHONE ENCOUNTER
If you talk to him or his mom does he want me to print this prescription to take to another pharmacy?

## 2023-08-03 NOTE — TELEPHONE ENCOUNTER
----- Message from Mallory Fenton sent at 8/3/2023  3:47 PM CDT -----  Type:  Patient Returning Call     Who Called: pt's mother  Who Left Message for Patient: Ava  Does the patient know what this is regarding?:  Would the patient rather a call back or a response via [facility]?  call  Best Call Back Number: [phone]  Additional Information:

## 2023-08-03 NOTE — TELEPHONE ENCOUNTER
Spoke to [facility] pharmacy. Diagnosis code provided.   Pharmacy stated the methylphenidate HCl 54 MG CR tablets have been on back order for the past few days, not sure when it will be available. Attempted to reach gina Hammer for her to return call regarding medication.

## 2023-08-03 NOTE — TELEPHONE ENCOUNTER
----- Message from Mallory Fenton sent at 8/3/2023  3:47 PM CDT -----  Type:  Patient Returning Call    Who Called: pt's mother  Who Left Message for Patient: Ava  Does the patient know what this is regarding?:  Would the patient rather a call back or a response via [facility]?  call  Best Call Back Number: [phone]  Additional Information:

## 2023-08-04 RX ORDER — METHYLPHENIDATE HYDROCHLORIDE 54 MG/1
54 TABLET ORAL EVERY MORNING
Qty: 30 TABLET | Refills: 0 | Status: SHIPPED | OUTPATIENT
Start: 2023-08-04 | End: 2023-09-17 | Stop reason: SDUPTHER

## 2023-09-17 DIAGNOSIS — F98.8 ATTENTION DEFICIT DISORDER, UNSPECIFIED HYPERACTIVITY PRESENCE: ICD-10-CM

## 2023-09-17 NOTE — TELEPHONE ENCOUNTER
No care due was identified.  Health [facility] Embedded Care Due Messages. Reference number: [id number].   9/17/2023 8:05:44 AM CDT

## 2023-09-18 RX ORDER — METHYLPHENIDATE HYDROCHLORIDE 54 MG/1
54 TABLET ORAL EVERY MORNING
Qty: 30 TABLET | Refills: 0 | Status: SHIPPED | OUTPATIENT
Start: 2023-09-18 | End: 2023-11-14 | Stop reason: SDUPTHER

## 2023-11-14 DIAGNOSIS — F98.8 ATTENTION DEFICIT DISORDER, UNSPECIFIED HYPERACTIVITY PRESENCE: ICD-10-CM

## 2023-11-14 NOTE — TELEPHONE ENCOUNTER
No care due was identified.  Health [facility] Embedded Care Due Messages. Reference number: [id number].   11/14/2023 10:47:28 AM CST

## 2023-11-15 RX ORDER — METHYLPHENIDATE HYDROCHLORIDE 54 MG/1
54 TABLET ORAL EVERY MORNING
Qty: 30 TABLET | Refills: 0 | Status: SHIPPED | OUTPATIENT
Start: 2023-11-15 | End: 2024-01-10 | Stop reason: SDUPTHER

## 2024-01-10 ENCOUNTER — TELEPHONE (OUTPATIENT)
Dept: FAMILY MEDICINE | Facility: CLINIC | Age: 24
End: 2024-01-10

## 2024-01-10 ENCOUNTER — OFFICE VISIT (OUTPATIENT)
Dept: FAMILY MEDICINE | Facility: CLINIC | Age: 24
End: 2024-01-10
Payer: MEDICAID

## 2024-01-10 VITALS
TEMPERATURE: 98 F | OXYGEN SATURATION: 95 % | HEART RATE: 88 BPM | SYSTOLIC BLOOD PRESSURE: 120 MMHG | BODY MASS INDEX: 46.65 KG/M2 | WEIGHT: 315 LBS | DIASTOLIC BLOOD PRESSURE: 60 MMHG | HEIGHT: 69 IN

## 2024-01-10 DIAGNOSIS — Z00.00 ROUTINE HEALTH MAINTENANCE: Primary | ICD-10-CM

## 2024-01-10 DIAGNOSIS — Z23 NEED FOR INFLUENZA VACCINATION: ICD-10-CM

## 2024-01-10 DIAGNOSIS — F98.8 ATTENTION DEFICIT DISORDER, UNSPECIFIED HYPERACTIVITY PRESENCE: ICD-10-CM

## 2024-01-10 DIAGNOSIS — E66.01 MORBID OBESITY DUE TO EXCESS CALORIES: ICD-10-CM

## 2024-01-10 PROCEDURE — 3078F DIAST BP <80 MM HG: CPT | Mod: CPTII,S$GLB,, | Performed by: FAMILY MEDICINE

## 2024-01-10 PROCEDURE — 3074F SYST BP LT 130 MM HG: CPT | Mod: CPTII,S$GLB,, | Performed by: FAMILY MEDICINE

## 2024-01-10 PROCEDURE — 3008F BODY MASS INDEX DOCD: CPT | Mod: CPTII,S$GLB,, | Performed by: FAMILY MEDICINE

## 2024-01-10 PROCEDURE — 1159F MED LIST DOCD IN RCRD: CPT | Mod: CPTII,S$GLB,, | Performed by: FAMILY MEDICINE

## 2024-01-10 PROCEDURE — 90686 IIV4 VACC NO PRSV 0.5 ML IM: CPT | Mod: S$GLB,,, | Performed by: FAMILY MEDICINE

## 2024-01-10 PROCEDURE — 99395 PREV VISIT EST AGE 18-39: CPT | Mod: 25,S$GLB,, | Performed by: FAMILY MEDICINE

## 2024-01-10 PROCEDURE — 90471 IMMUNIZATION ADMIN: CPT | Mod: S$GLB,,, | Performed by: FAMILY MEDICINE

## 2024-01-10 RX ORDER — METHYLPHENIDATE HYDROCHLORIDE 54 MG/1
54 TABLET ORAL EVERY MORNING
Qty: 30 TABLET | Refills: 0 | Status: SHIPPED | OUTPATIENT
Start: 2024-01-10 | End: 2024-01-18 | Stop reason: SDUPTHER

## 2024-01-10 NOTE — TELEPHONE ENCOUNTER
Care Due:                  Date            Visit Type   Department     Provider  --------------------------------------------------------------------------------                                EP -                              PRIMARY      [facility] FAMILY  Last Visit: 01-      CARE ([facility])   MEDICINE       Arnie Foster  Next Visit: None Scheduled  None         None Found                                                            Last  Test          Frequency    Reason                     Performed    Due Date  --------------------------------------------------------------------------------    HBA1C.......  6 months...  tirzepatide,.............  Not Found    Overdue    [employer] Embedded Care Due Messages. Reference number: [id number].   1/10/2024 4:05:23 PM CST

## 2024-01-11 RX ORDER — TIRZEPATIDE 2.5 MG/.5ML
INJECTION, SOLUTION SUBCUTANEOUS
Refills: 0 | OUTPATIENT
Start: 2024-01-11

## 2024-01-11 NOTE — TELEPHONE ENCOUNTER
Pharmacy is requesting that a PRIOR AUTHORIZATION be completed for the Zepbound. Please forward this request to the staff member handling PAs for your clinic. Thank you.    Provider Staff:  Action required for this patient: A1C outdated       Please see care gap opportunities below in Care Due Message.    Thanks!  [facility]     Appointments      Date Provider   Last Visit   1/10/2024 Arnie Foster MD   Next Visit   Visit date not found Arnie Foster MD       Note composed:2:04 PM 01/11/2024       Refill Decision Note   James Loya  is requesting a refill authorization.  Brief Assessment and Rationale for Refill:  Route     Medication Therapy Plan:        Pharmacist review requested: Yes   Comments:     Note composed:2:04 PM 01/11/2024

## 2024-01-11 NOTE — TELEPHONE ENCOUNTER
Refill Routing Note   Medication(s) are not appropriate for processing by [facility] for the following reason(s):        Med affordability    [facility] action(s):  Defer        Medication Therapy Plan: Alternate request:  tirzepatide    Pharmacist review requested: Yes     Appointments  past 12m or future 3m with PCP    Date Provider   Last Visit   1/10/2024 Arnie Foster MD   Next Visit   Visit date not found Arnie Foster MD   ED visits in past 90 days: 0        Note composed:8:57 PM 01/10/2024

## 2024-01-17 ENCOUNTER — PATIENT MESSAGE (OUTPATIENT)
Dept: FAMILY MEDICINE | Facility: CLINIC | Age: 24
End: 2024-01-17
Payer: MEDICAID

## 2024-01-17 DIAGNOSIS — E66.01 MORBID OBESITY DUE TO EXCESS CALORIES: ICD-10-CM

## 2024-01-17 DIAGNOSIS — F98.8 ATTENTION DEFICIT DISORDER, UNSPECIFIED HYPERACTIVITY PRESENCE: ICD-10-CM

## 2024-01-18 RX ORDER — METHYLPHENIDATE HYDROCHLORIDE 54 MG/1
54 TABLET ORAL EVERY MORNING
Qty: 30 TABLET | Refills: 0 | Status: SHIPPED | OUTPATIENT
Start: 2024-01-18 | End: 2024-02-27 | Stop reason: SDUPTHER

## 2024-01-18 NOTE — TELEPHONE ENCOUNTER
No care due was identified.  Health [facility] Embedded Care Due Messages. Reference number: [id number].   1/18/2024 7:38:05 AM CST

## 2024-01-28 NOTE — PROGRESS NOTES
" Patient ID: James Loya is a 23 y.o. male.    Chief Complaint: add check up and Weight Check (/)    HPI      James Loya is a 23 y.o. male here for annual visit.  Patient also with inattention-uses medicines for ADD.  Not able to get them recently because of shortage.  Weight has increased.    Vitals:    01/10/24 1540   BP: 120/60   BP Location: Left arm   Patient Position: Sitting   Pulse: 88   Temp: 98.4 °F (36.9 °C)   TempSrc: Oral   SpO2: 95%   Weight: (!) 191.6 kg (422 lb 6.4 oz)   Height: 5' 9" (1.753 m)            Review of Symptoms      Physical Exam    Constitutional:  Oriented to person, place, and time.appears well-developed and well-nourished.  No distress.      HENT  Head: Normocephalic and atraumatic  Right Ear: External ear normal.   Left Ear: External ear normal.   Nose: External nose normal.   Mouth:  Moist mucus membranes.    Eyes:  Conjunctivae are normal. Right eye exhibits no discharge.  Left eye exhibits no discharge. No scleral icterus.  No periorbital edema    Cardiovascular:  Regular rate and rhythm with normal S1 and S2     Pulmonary/Chest:   Clear to auscultation bilaterally without wheezes, rhonchi or rales      Musculoskeletal:  No edema. No obvious deformity No wasting       Neurological:  Alert and oriented to person, place, and time.   Coordination normal.     Skin:   Skin is warm and dry.  No diaphoresis.   No rash noted.     Psychiatric: Normal mood and affect. Behavior is normal.  Judgment and thought content normal.     Complete Blood Count  Lab Results   Component Value Date    RBC 4.63 05/28/2020    HGB 15.2 05/28/2020    HCT 44.5 05/28/2020    MCV 96 05/28/2020    MCH 32.8 (H) 05/28/2020    MCHC 34.2 05/28/2020    RDW 11.7 05/28/2020     05/28/2020    MPV 11.2 05/28/2020    GRAN 4.1 05/28/2020    GRAN 54.1 05/28/2020    LYMPH 2.6 05/28/2020    LYMPH 34.6 05/28/2020    MONO 0.6 05/28/2020    MONO 8.4 05/28/2020    EOS 0.2 05/28/2020    BASO 0.04 " "05/28/2020    EOSINOPHIL 2.1 05/28/2020    BASOPHIL 0.5 05/28/2020    DIFFMETHOD Automated 05/28/2020       Comprehensive Metabolic Panel  No results found for: "GLU", "BUN", "CREATININE", "NA", "K", "CL", "PROT", "ALBUMIN", "BILITOT", "AST", "ALKPHOS", "CO2", "ALT", "ANIONGAP", "EGFRNONAA", "ESTGFRAFRICA"    TSH  No results found for: "TSH"    Assessment / Plan:      ICD-10-CM ICD-9-CM   1. Routine health maintenance  Z00.00 V70.0   2. Need for influenza vaccination  Z23 V04.81   3. Attention deficit disorder, unspecified hyperactivity presence  F98.8 314.00   4. BMI 60.0-69.9, adult  Z68.44 V85.44   5. Morbid obesity due to excess calories  E66.01 278.01     Routine health maintenance    Need for influenza vaccination  -     Influenza - Quadrivalent *Preferred* (6 months+) (PF)    Attention deficit disorder, unspecified hyperactivity presence  -     Discontinue: methylphenidate HCl 54 MG CR tablet; Take 1 tablet (54 mg total) by mouth every morning.  Dispense: 30 tablet; Refill: 0  -     Comprehensive Metabolic Panel; Future  -     CBC Auto Differential; Future  -     Lipid Panel; Future  -     TSH; Future    BMI 60.0-69.9, adult  -     Discontinue: methylphenidate HCl 54 MG CR tablet; Take 1 tablet (54 mg total) by mouth every morning.  Dispense: 30 tablet; Refill: 0  -     Comprehensive Metabolic Panel; Future  -     CBC Auto Differential; Future  -     Lipid Panel; Future  -     TSH; Future  -     Ambulatory referral/consult to Bariatric/Obesity Medicine; Future; Expected date: 01/17/2024    Morbid obesity due to excess calories  -     Discontinue: methylphenidate HCl 54 MG CR tablet; Take 1 tablet (54 mg total) by mouth every morning.  Dispense: 30 tablet; Refill: 0  -     Comprehensive Metabolic Panel; Future  -     CBC Auto Differential; Future  -     Lipid Panel; Future  -     TSH; Future  -     Ambulatory referral/consult to Bariatric/Obesity Medicine; Future; Expected date: 01/17/2024    Other orders  -   "   tirzepatide, weight loss, 2.5 mg/0.5 mL PnIj; Inject 2.5 mg into the skin every 7 days.  Dispense: 4 Pen; Refill: 0    Discussed use of medication to reduce his weight    Discussed exercise calorie count

## 2024-02-27 DIAGNOSIS — F98.8 ATTENTION DEFICIT DISORDER, UNSPECIFIED HYPERACTIVITY PRESENCE: ICD-10-CM

## 2024-02-27 DIAGNOSIS — E66.01 MORBID OBESITY DUE TO EXCESS CALORIES: ICD-10-CM

## 2024-02-27 NOTE — TELEPHONE ENCOUNTER
No care due was identified.  Health [facility] Embedded Care Due Messages. Reference number: [id number].   2/27/2024 12:02:05 PM CST

## 2024-02-28 RX ORDER — METHYLPHENIDATE HYDROCHLORIDE 54 MG/1
54 TABLET ORAL EVERY MORNING
Qty: 30 TABLET | Refills: 0 | Status: SHIPPED | OUTPATIENT
Start: 2024-02-28 | End: 2024-05-22 | Stop reason: SDUPTHER

## 2024-04-26 ENCOUNTER — TELEPHONE (OUTPATIENT)
Dept: FAMILY MEDICINE | Facility: CLINIC | Age: 24
End: 2024-04-26
Payer: MEDICAID

## 2024-04-26 NOTE — TELEPHONE ENCOUNTER
----- Message from Shelby Bradshaw sent at 4/26/2024  4:44 PM CDT -----  Type:  Pharmacy Calling to Clarify an RX    Name of Caller:  Pharmacy Name:[facility]   Prescription Name:methylphenidate HCl 54 MG CR tablet  What do they need to clarify?:diagnosis code F98.8 not going through.  Best Call Back Number:[phone]  Additional Information: need diagnosis code  the code on script is not going through.. may want to send in new script

## 2024-05-01 ENCOUNTER — PATIENT MESSAGE (OUTPATIENT)
Dept: FAMILY MEDICINE | Facility: CLINIC | Age: 24
End: 2024-05-01
Payer: MEDICAID

## 2024-05-01 RX ORDER — TIRZEPATIDE 2.5 MG/.5ML
2.5 INJECTION, SOLUTION SUBCUTANEOUS
Qty: 4 ML | Refills: 0 | Status: SHIPPED | OUTPATIENT
Start: 2024-05-01

## 2024-05-02 RX ORDER — SEMAGLUTIDE 0.68 MG/ML
0.5 INJECTION, SOLUTION SUBCUTANEOUS
Qty: 1 EACH | Refills: 3 | Status: SHIPPED | OUTPATIENT
Start: 2024-05-02

## 2024-05-18 ENCOUNTER — HOSPITAL ENCOUNTER (EMERGENCY)
Facility: HOSPITAL | Age: 24
Discharge: HOME | End: 2024-05-18
Attending: EMERGENCY MEDICINE
Payer: MEDICAID

## 2024-05-18 VITALS
BODY MASS INDEX: 44.1 KG/M2 | OXYGEN SATURATION: 96 % | HEART RATE: 100 BPM | HEIGHT: 71 IN | SYSTOLIC BLOOD PRESSURE: 169 MMHG | RESPIRATION RATE: 18 BRPM | TEMPERATURE: 99 F | WEIGHT: 315 LBS | DIASTOLIC BLOOD PRESSURE: 72 MMHG

## 2024-05-18 DIAGNOSIS — T63.464A WASP STING, UNDETERMINED INTENT, INITIAL ENCOUNTER: Primary | ICD-10-CM

## 2024-05-18 PROCEDURE — 96372 THER/PROPH/DIAG INJ SC/IM: CPT | Performed by: EMERGENCY MEDICINE

## 2024-05-18 PROCEDURE — 25000003 PHARM REV CODE 250: Mod: ER | Performed by: EMERGENCY MEDICINE

## 2024-05-18 PROCEDURE — 63600175 PHARM REV CODE 636 W HCPCS: Mod: ER | Performed by: EMERGENCY MEDICINE

## 2024-05-18 PROCEDURE — 99284 EMERGENCY DEPT VISIT MOD MDM: CPT | Mod: 25,ER

## 2024-05-18 RX ORDER — HYDROXYZINE HYDROCHLORIDE 25 MG/1
25 TABLET, FILM COATED ORAL EVERY 6 HOURS
Qty: 20 TABLET | Refills: 0 | Status: SHIPPED | OUTPATIENT
Start: 2024-05-18

## 2024-05-18 RX ORDER — DEXAMETHASONE SODIUM PHOSPHATE 4 MG/ML
8 INJECTION, SOLUTION INTRA-ARTICULAR; INTRALESIONAL; INTRAMUSCULAR; INTRAVENOUS; SOFT TISSUE
Status: COMPLETED | OUTPATIENT
Start: 2024-05-18 | End: 2024-05-18

## 2024-05-18 RX ORDER — PREDNISONE 20 MG/1
60 TABLET ORAL DAILY
Qty: 21 TABLET | Refills: 0 | Status: SHIPPED | OUTPATIENT
Start: 2024-05-18 | End: 2024-05-25

## 2024-05-18 RX ORDER — FAMOTIDINE 20 MG/1
20 TABLET, FILM COATED ORAL
Status: COMPLETED | OUTPATIENT
Start: 2024-05-18 | End: 2024-05-18

## 2024-05-18 RX ADMIN — FAMOTIDINE 20 MG: 20 TABLET, FILM COATED ORAL at 09:05

## 2024-05-18 RX ADMIN — DEXAMETHASONE SODIUM PHOSPHATE 8 MG: 4 INJECTION, SOLUTION INTRA-ARTICULAR; INTRALESIONAL; INTRAMUSCULAR; INTRAVENOUS; SOFT TISSUE at 09:05

## 2024-05-19 NOTE — ED PROVIDER NOTES
Encounter Date: 5/18/2024       History     Chief Complaint   Patient presents with    Insect Bite     Pt reports was stung by a wasp approx 1 hr ago and took 50 mg benadryl with increased swelling to right eye      HPI  23 y.o.   Wasp sting R face about 1+ hr ago, took some benadryl   Co swelling to R face, no difficulty breathing  Thinks he might have been stung by multiple as he walked into nest  Prior stings, no sig allergic rx    Review of patient's allergies indicates:  No Known Allergies  Past Medical History:   Diagnosis Date    ADHD     Asthma, well controlled     Developmental delay     Mild mental retardation     Obesity     Snoring      Past Surgical History:   Procedure Laterality Date    ADENOIDECTOMY  2015    HERNIA REPAIR  2004    None      TONSILLECTOMY  2016    TYMPANOSTOMY TUBE PLACEMENT       Family History   Problem Relation Name Age of Onset    Alcohol abuse Father Don     Atrial fibrillation Maternal Grandfather      Depression Paternal Grandfather Don     Mental illness Paternal Grandfather Don     Drug abuse Paternal Uncle Manan     Heart disease Maternal Grandmother Zuleima      Social History     Tobacco Use    Smoking status: Never     Passive exposure: Never    Smokeless tobacco: Never    Tobacco comments:     No NAZIA   Substance Use Topics    Alcohol use: No    Drug use: No     Review of Systems  All systems were reviewed/examined and were negative except as noted in the HPI.    Physical Exam     Initial Vitals [05/18/24 2058]   BP Pulse Resp Temp SpO2   (!) 169/72 100 18 98.9 °F (37.2 °C) 96 %      MAP       --         Physical Exam    General: the patient is awake, alert, and in no apparent distress.  Head: normocephalic and atraumatic, sclera are clear    R periorbital swelling, when eye is opened manually PERRL and nl gross vision  OP wnl   Nl voice no stridor  Neck: supple without meningismus  Chest: clear to auscultation bilaterally, no respiratory distress  Heart: regular rate and  rhythm  Extremities: warm and well perfused  Skin: warm and dry  Psych conversant  Neuro: awake, alert, moving all extremities    ED Course   Procedures  Labs Reviewed - No data to display       Imaging Results    None          Medications   dexAMETHasone injection 8 mg (has no administration in time range)   famotidine tablet 20 mg (has no administration in time range)     Medical Decision Making  Risk  Prescription drug management.      Medical Decision Making:    This is an emergent evaluation of a patient presenting to the ED.  Nursing notes were reviewed.    I decided to obtain and review old medical records, which showed: well care    Evaluation for Emergency Medical Condition  The patient received a medical screening exam and within a reasonable degree of clinical confidence an emergency medical condition has not been identified.  The patient is instructed on proper follow up and return precautions to the ED.    Seems most c/w local allergic rx      Naman Vitale MD, MEG                                    Clinical Impression:  Final diagnoses:  [T63.464A] Wasp sting, undetermined intent, initial encounter (Primary)          ED Disposition Condition    Discharge Stable          ED Prescriptions       Medication Sig Dispense Start Date End Date Auth. Provider    predniSONE (DELTASONE) 20 MG tablet Take 3 tablets (60 mg total) by mouth once daily. for 7 days 21 tablet 5/18/2024 5/25/2024 Shaggy Vitale MD    hydrOXYzine HCL (ATARAX) 25 MG tablet Take 1 tablet (25 mg total) by mouth every 6 (six) hours. 20 tablet 5/18/2024 -- Shaggy Vitale MD          Follow-up Information       Follow up With Specialties Details Why Contact Info    Arnie Foster MD Family Medicine Schedule an appointment as soon as possible for a visit   [address] [place] [address] [phone]            Discharged to home in stable condition, return to ED warnings given, follow up and patient care instructions  given.      Naman Vitale MD, MEG, [facility]  Department of Emergency Medicine       Shaggy Vitale MD  05/19/24 2395

## 2024-05-22 DIAGNOSIS — E66.01 MORBID OBESITY DUE TO EXCESS CALORIES: ICD-10-CM

## 2024-05-22 DIAGNOSIS — F98.8 ATTENTION DEFICIT DISORDER, UNSPECIFIED HYPERACTIVITY PRESENCE: ICD-10-CM

## 2024-05-22 NOTE — TELEPHONE ENCOUNTER
Care Due:                  Date            Visit Type   Department     Provider  --------------------------------------------------------------------------------                                EP -                              PRIMARY      [facility]  Last Visit: 01-      CARE ([facility])   MEDICINE       Arnie Foster                               -                              PRIMARY      [facility]  Next Visit: 07-      CARE ([facility])   [facility]       Arnie Foster                                                            Last  Test          Frequency    Reason                     Performed    Due Date  --------------------------------------------------------------------------------    HBA1C.......  6 months...  semaglutide, tirzepatide.  Not Found    Overdue    [facility] Embedded Care Due Messages. Reference number: [id number].   5/22/2024 5:32:34 PM CDT

## 2024-05-23 RX ORDER — METHYLPHENIDATE HYDROCHLORIDE 54 MG/1
54 TABLET ORAL EVERY MORNING
Qty: 30 TABLET | Refills: 0 | Status: SHIPPED | OUTPATIENT
Start: 2024-05-23

## 2024-06-27 DIAGNOSIS — F98.8 ATTENTION DEFICIT DISORDER, UNSPECIFIED HYPERACTIVITY PRESENCE: ICD-10-CM

## 2024-06-27 DIAGNOSIS — E66.01 MORBID OBESITY DUE TO EXCESS CALORIES: ICD-10-CM

## 2024-06-27 RX ORDER — METHYLPHENIDATE HYDROCHLORIDE 54 MG/1
54 TABLET ORAL EVERY MORNING
Qty: 30 TABLET | Refills: 0 | Status: SHIPPED | OUTPATIENT
Start: 2024-06-27

## 2024-06-27 NOTE — TELEPHONE ENCOUNTER
No care due was identified.  Health [facility] Embedded Care Due Messages. Reference number: [id number].   6/27/2024 11:09:03 AM CDT

## 2024-07-18 ENCOUNTER — OFFICE VISIT (OUTPATIENT)
Dept: FAMILY MEDICINE | Facility: CLINIC | Age: 24
End: 2024-07-18
Payer: MEDICAID

## 2024-07-18 VITALS
WEIGHT: 315 LBS | TEMPERATURE: 99 F | DIASTOLIC BLOOD PRESSURE: 62 MMHG | OXYGEN SATURATION: 97 % | BODY MASS INDEX: 44.1 KG/M2 | SYSTOLIC BLOOD PRESSURE: 124 MMHG | HEIGHT: 71 IN

## 2024-07-18 DIAGNOSIS — F98.8 ATTENTION DEFICIT DISORDER, UNSPECIFIED HYPERACTIVITY PRESENCE: ICD-10-CM

## 2024-07-18 DIAGNOSIS — J45.909 ASTHMA, WELL CONTROLLED, UNSPECIFIED ASTHMA SEVERITY, UNSPECIFIED WHETHER PERSISTENT: ICD-10-CM

## 2024-07-18 DIAGNOSIS — Z00.00 ROUTINE HEALTH MAINTENANCE: Primary | ICD-10-CM

## 2024-07-18 PROCEDURE — 3074F SYST BP LT 130 MM HG: CPT | Mod: CPTII,S$GLB,, | Performed by: FAMILY MEDICINE

## 2024-07-18 PROCEDURE — 99395 PREV VISIT EST AGE 18-39: CPT | Mod: S$GLB,,, | Performed by: FAMILY MEDICINE

## 2024-07-18 PROCEDURE — 1159F MED LIST DOCD IN RCRD: CPT | Mod: CPTII,S$GLB,, | Performed by: FAMILY MEDICINE

## 2024-07-18 PROCEDURE — 1160F RVW MEDS BY RX/DR IN RCRD: CPT | Mod: CPTII,S$GLB,, | Performed by: FAMILY MEDICINE

## 2024-07-18 PROCEDURE — 3078F DIAST BP <80 MM HG: CPT | Mod: CPTII,S$GLB,, | Performed by: FAMILY MEDICINE

## 2024-07-18 PROCEDURE — 3008F BODY MASS INDEX DOCD: CPT | Mod: CPTII,S$GLB,, | Performed by: FAMILY MEDICINE

## 2024-07-18 NOTE — LETTER
July 18, 2024      [address] [address]  Phone: [phone]  Fax: [phone]       Patient: James Loya   YOB: 2000  Date of Visit: 07/18/2024    To Whom It May Concern:    Sherly Loya  was at [facility] on 07/18/2024. He may return to work on 7/18/24 with no restrictions. If you have any questions or concerns, or if I can be of further assistance, please do not hesitate to contact me.    Sincerely,        Arnie Foster

## 2024-07-19 ENCOUNTER — LAB VISIT (OUTPATIENT)
Dept: LAB | Facility: HOSPITAL | Age: 24
End: 2024-07-19
Attending: FAMILY MEDICINE
Payer: MEDICAID

## 2024-07-19 DIAGNOSIS — E66.01 MORBID OBESITY DUE TO EXCESS CALORIES: ICD-10-CM

## 2024-07-19 DIAGNOSIS — F98.8 ATTENTION DEFICIT DISORDER, UNSPECIFIED HYPERACTIVITY PRESENCE: ICD-10-CM

## 2024-07-19 LAB
ALBUMIN SERPL BCP-MCNC: 4.2 G/DL (ref 3.5–5.2)
ALP SERPL-CCNC: 58 U/L (ref 38–126)
ALT SERPL W/O P-5'-P-CCNC: 46 U/L (ref 10–44)
ANION GAP SERPL CALC-SCNC: 11 MMOL/L (ref 8–16)
AST SERPL-CCNC: 35 U/L (ref 15–46)
BASOPHILS # BLD AUTO: 0.04 K/UL (ref 0–0.2)
BASOPHILS NFR BLD: 0.6 % (ref 0–1.9)
BILIRUB SERPL-MCNC: 0.4 MG/DL (ref 0.1–1)
CALCIUM SERPL-MCNC: 9.1 MG/DL (ref 8.7–10.5)
CHLORIDE SERPL-SCNC: 103 MMOL/L (ref 95–110)
CHOLEST SERPL-MCNC: 200 MG/DL (ref 120–199)
CHOLEST/HDLC SERPL: 4 {RATIO} (ref 2–5)
CO2 SERPL-SCNC: 28 MMOL/L (ref 23–29)
CREAT SERPL-MCNC: 0.85 MG/DL (ref 0.5–1.4)
DIFFERENTIAL METHOD BLD: ABNORMAL
EOSINOPHIL # BLD AUTO: 0.1 K/UL (ref 0–0.5)
EOSINOPHIL NFR BLD: 1.5 % (ref 0–8)
ERYTHROCYTE [DISTWIDTH] IN BLOOD BY AUTOMATED COUNT: 12.1 % (ref 11.5–14.5)
EST. GFR  (NO RACE VARIABLE): >60 ML/MIN/1.73 M^2
GLUCOSE SERPL-MCNC: 95 MG/DL (ref 70–110)
HCT VFR BLD AUTO: 46.9 % (ref 40–54)
HDLC SERPL-MCNC: 50 MG/DL (ref 40–75)
HDLC SERPL: 25 % (ref 20–50)
HGB BLD-MCNC: 15.6 G/DL (ref 14–18)
IMM GRANULOCYTES # BLD AUTO: 0.03 K/UL (ref 0–0.04)
IMM GRANULOCYTES NFR BLD AUTO: 0.5 % (ref 0–0.5)
LDLC SERPL CALC-MCNC: 129.2 MG/DL (ref 63–159)
LYMPHOCYTES # BLD AUTO: 2 K/UL (ref 1–4.8)
LYMPHOCYTES NFR BLD: 30.3 % (ref 18–48)
MCH RBC QN AUTO: 32.6 PG (ref 27–31)
MCHC RBC AUTO-ENTMCNC: 33.3 G/DL (ref 32–36)
MCV RBC AUTO: 98 FL (ref 82–98)
MONOCYTES # BLD AUTO: 0.6 K/UL (ref 0.3–1)
MONOCYTES NFR BLD: 9.3 % (ref 4–15)
NEUTROPHILS # BLD AUTO: 3.8 K/UL (ref 1.8–7.7)
NEUTROPHILS NFR BLD: 57.8 % (ref 38–73)
NONHDLC SERPL-MCNC: 150 MG/DL
NRBC BLD-RTO: 0 /100 WBC
PLATELET # BLD AUTO: 242 K/UL (ref 150–450)
PMV BLD AUTO: 10.4 FL (ref 9.2–12.9)
POTASSIUM SERPL-SCNC: 4.3 MMOL/L (ref 3.5–5.1)
PROT SERPL-MCNC: 7.1 G/DL (ref 6–8.4)
RBC # BLD AUTO: 4.79 M/UL (ref 4.6–6.2)
SODIUM SERPL-SCNC: 142 MMOL/L (ref 136–145)
TRIGL SERPL-MCNC: 104 MG/DL (ref 30–150)
TSH SERPL DL<=0.005 MIU/L-ACNC: 1.8 UIU/ML (ref 0.4–4)
UUN UR-MCNC: 19 MG/DL (ref 2–20)
WBC # BLD AUTO: 6.57 K/UL (ref 3.9–12.7)

## 2024-07-19 PROCEDURE — 84443 ASSAY THYROID STIM HORMONE: CPT | Mod: PN | Performed by: FAMILY MEDICINE

## 2024-07-19 PROCEDURE — 80061 LIPID PANEL: CPT | Performed by: FAMILY MEDICINE

## 2024-07-19 PROCEDURE — 36415 COLL VENOUS BLD VENIPUNCTURE: CPT | Mod: PN | Performed by: FAMILY MEDICINE

## 2024-07-19 PROCEDURE — 85025 COMPLETE CBC W/AUTO DIFF WBC: CPT | Mod: PN | Performed by: FAMILY MEDICINE

## 2024-07-19 PROCEDURE — 80053 COMPREHEN METABOLIC PANEL: CPT | Mod: PN | Performed by: FAMILY MEDICINE

## 2024-08-21 DIAGNOSIS — E66.01 MORBID OBESITY DUE TO EXCESS CALORIES: ICD-10-CM

## 2024-08-21 DIAGNOSIS — F98.8 ATTENTION DEFICIT DISORDER, UNSPECIFIED HYPERACTIVITY PRESENCE: ICD-10-CM

## 2024-08-21 NOTE — TELEPHONE ENCOUNTER
Care Due:                  Date            Visit Type   Department     Provider  --------------------------------------------------------------------------------                                EP -                              PRIMARY      [facility]  Last Visit: 07-      CARE ([facility])   MEDICINE       Arnie Foster                              ESTABLISHED                              PATIENT -    [facility]  Next Visit: 01-      VIRTUAL      MEDICINE       Arnie Foster                                                            Last  Test          Frequency    Reason                     Performed    Due Date  --------------------------------------------------------------------------------    HBA1C.......  6 months...  semaglutide, tirzepatide.  Not Found    Overdue    [facility] [employer] Embedded Care Due Messages. Reference number: [id number].   8/21/2024 7:57:38 AM CDT

## 2024-08-22 RX ORDER — METHYLPHENIDATE HYDROCHLORIDE 54 MG/1
54 TABLET ORAL EVERY MORNING
Qty: 30 TABLET | Refills: 0 | Status: SHIPPED | OUTPATIENT
Start: 2024-08-22

## 2024-10-04 ENCOUNTER — PATIENT MESSAGE (OUTPATIENT)
Dept: FAMILY MEDICINE | Facility: CLINIC | Age: 24
End: 2024-10-04
Payer: MEDICAID

## 2024-10-04 DIAGNOSIS — E66.01 MORBID OBESITY DUE TO EXCESS CALORIES: ICD-10-CM

## 2024-10-04 DIAGNOSIS — F98.8 ATTENTION DEFICIT DISORDER, UNSPECIFIED TYPE: ICD-10-CM

## 2024-10-04 RX ORDER — METHYLPHENIDATE HYDROCHLORIDE 54 MG/1
54 TABLET ORAL EVERY MORNING
Qty: 30 TABLET | Refills: 0 | Status: SHIPPED | OUTPATIENT
Start: 2024-10-04

## 2024-11-04 DIAGNOSIS — E66.01 MORBID OBESITY DUE TO EXCESS CALORIES: ICD-10-CM

## 2024-11-04 DIAGNOSIS — F98.8 ATTENTION DEFICIT DISORDER, UNSPECIFIED TYPE: ICD-10-CM

## 2024-11-04 RX ORDER — METHYLPHENIDATE HYDROCHLORIDE 54 MG/1
54 TABLET ORAL EVERY MORNING
Qty: 30 TABLET | Refills: 0 | Status: SHIPPED | OUTPATIENT
Start: 2024-11-04

## 2024-11-04 NOTE — TELEPHONE ENCOUNTER
Care Due:                  Date            Visit Type   Department     Provider  --------------------------------------------------------------------------------                                EP -                              PRIMARY      [facility]  Last Visit: 07-      CARE ([facility])   MEDICINE       Arnie Foster                              ESTABLISHED                              PATIENT -    [facility]  Next Visit: 01-      VIRTUAL      MEDICINE       Arnie Foster                                                            Last  Test          Frequency    Reason                     Performed    Due Date  --------------------------------------------------------------------------------    HBA1C.......  6 months...  semaglutide, tirzepatide.  Not Found    Overdue    [employer] Embedded Care Due Messages. Reference number: [id number].   11/04/2024 12:24:40 PM CST   LMOM for patient to call clinic back and talk to any of the triage nurses.  Chyna Coleman RN

## 2024-11-06 ENCOUNTER — PATIENT MESSAGE (OUTPATIENT)
Dept: FAMILY MEDICINE | Facility: CLINIC | Age: 24
End: 2024-11-06

## 2024-11-06 ENCOUNTER — OFFICE VISIT (OUTPATIENT)
Dept: FAMILY MEDICINE | Facility: CLINIC | Age: 24
End: 2024-11-06
Payer: MEDICAID

## 2024-11-06 VITALS
OXYGEN SATURATION: 96 % | TEMPERATURE: 98 F | BODY MASS INDEX: 44.1 KG/M2 | HEIGHT: 71 IN | HEART RATE: 103 BPM | WEIGHT: 315 LBS | SYSTOLIC BLOOD PRESSURE: 124 MMHG | DIASTOLIC BLOOD PRESSURE: 84 MMHG

## 2024-11-06 DIAGNOSIS — F98.8 ATTENTION DEFICIT DISORDER, UNSPECIFIED TYPE: ICD-10-CM

## 2024-11-06 DIAGNOSIS — Z01.818 PREOPERATIVE EXAMINATION: Primary | ICD-10-CM

## 2024-11-06 DIAGNOSIS — E66.01 MORBID OBESITY DUE TO EXCESS CALORIES: ICD-10-CM

## 2024-11-06 PROCEDURE — 93010 ELECTROCARDIOGRAM REPORT: CPT | Mod: S$PBB,,, | Performed by: INTERNAL MEDICINE

## 2024-11-06 PROCEDURE — 3074F SYST BP LT 130 MM HG: CPT | Mod: CPTII,S$GLB,, | Performed by: FAMILY MEDICINE

## 2024-11-06 PROCEDURE — 1159F MED LIST DOCD IN RCRD: CPT | Mod: CPTII,S$GLB,, | Performed by: FAMILY MEDICINE

## 2024-11-06 PROCEDURE — 99213 OFFICE O/P EST LOW 20 MIN: CPT | Mod: S$GLB,,, | Performed by: FAMILY MEDICINE

## 2024-11-06 PROCEDURE — 93005 ELECTROCARDIOGRAM TRACING: CPT | Mod: S$GLB,,, | Performed by: FAMILY MEDICINE

## 2024-11-06 PROCEDURE — 1160F RVW MEDS BY RX/DR IN RCRD: CPT | Mod: CPTII,S$GLB,, | Performed by: FAMILY MEDICINE

## 2024-11-06 PROCEDURE — 3079F DIAST BP 80-89 MM HG: CPT | Mod: CPTII,S$GLB,, | Performed by: FAMILY MEDICINE

## 2024-11-06 PROCEDURE — 3008F BODY MASS INDEX DOCD: CPT | Mod: CPTII,S$GLB,, | Performed by: FAMILY MEDICINE

## 2024-11-06 RX ORDER — METHYLPHENIDATE HYDROCHLORIDE 54 MG/1
54 TABLET ORAL EVERY MORNING
Qty: 30 TABLET | Refills: 0 | Status: CANCELLED | OUTPATIENT
Start: 2024-11-06

## 2024-11-06 NOTE — LETTER
November 6, 2024    James Loya  [address] [facility] [address]         [place] [address] [address] [address]  Phone: [phone]  Fax: [phone] November 6, 2024     Patient: James Loya   YOB: 2000   Date of Visit: 11/6/2024       To Whom It May Concern:    It is my medical opinion that James Loya was at our clinic today for a visit and follow up testing     .    If you have any questions or concerns, please don't hesitate to call.    Sincerely,                Arnie Weiss M.D.

## 2024-11-06 NOTE — LETTER
November 6, 2024    James Loya  [address]  [address] [address]  Phone: [phone]  Fax: [phone] Dear Mr. Loya:          If you have any questions or concerns, please don't hesitate to call.    Sincerely,        Arnie Foster MD

## 2024-11-06 NOTE — LETTER
November 6, 2024      [address] [address]  Phone: [phone]  Fax: [phone]       Patient: James Loya   YOB: 2000  Date of Visit: 11/06/2024    To Whom It May Concern:    Sherly Loya  was at [facility] on 11/06/2024. The patient may return to work/school on 11/06/2024 with no restrictions. If you have any questions or concerns, or if I can be of further assistance, please do not hesitate to contact me.    Sincerely,    Arnie Foster MD

## 2024-11-06 NOTE — PROGRESS NOTES
" Patient ID: James Loya is a 24 y.o. male.    Chief Complaint: Pre-op Exam (Bariatric Sx)    HPI    James Loya is a 24 y.o. male here for preoperative visit prior to bariatric surgery.  No complaints of any systemic illness, fever chills nausea vomiting diarrhea.  No cardiovascular complaints such as shortness of breath chest pain palpitations.  No respiratory complaints.  No wheezing coughing nasal congestion.  Able to walk up two flights of stairs and perform daily tasks without any problems.                  Vitals:    11/06/24 1452   BP: 124/84   BP Location: Left arm   Patient Position: Sitting   Pulse: 103   Temp: 98.1 °F (36.7 °C)   TempSrc: Oral   SpO2: 96%   Weight: (!) 193.2 kg (426 lb 0.6 oz)   Height: 5' 11" (1.803 m)            Review of Symptoms      Physical Exam    Constitutional:  Oriented to person, place, and time.appears well-developed and well-nourished.  No distress.      HENT  Head: Normocephalic and atraumatic  Right Ear: External ear normal.   Left Ear: External ear normal.   Nose: External nose normal.   Mouth:  Moist mucus membranes.    Eyes:  Conjunctivae are normal. Right eye exhibits no discharge.  Left eye exhibits no discharge. No scleral icterus.  No periorbital edema    Cardiovascular:  Regular rate and rhythm with normal S1 and S2     Pulmonary/Chest:   Clear to auscultation bilaterally without wheezes, rhonchi or rales      Musculoskeletal:  No edema. No obvious deformity No wasting       Neurological:  Alert and oriented to person, place, and time.   Coordination normal.     Skin:   Skin is warm and dry.  No diaphoresis.   No rash noted.     Psychiatric: Normal mood and affect. Behavior is normal.  Judgment and thought content normal.     Physical Exam              Complete Blood Count  Lab Results   Component Value Date    RBC 4.79 07/19/2024    HGB 15.6 07/19/2024    HCT 46.9 07/19/2024    MCV 98 07/19/2024    MCH 32.6 (H) 07/19/2024    MCHC 33.3 " 07/19/2024    RDW 12.1 07/19/2024     07/19/2024    MPV 10.4 07/19/2024    GRAN 3.8 07/19/2024    GRAN 57.8 07/19/2024    LYMPH 2.0 07/19/2024    LYMPH 30.3 07/19/2024    MONO 0.6 07/19/2024    MONO 9.3 07/19/2024    EOS 0.1 07/19/2024    BASO 0.04 07/19/2024    EOSINOPHIL 1.5 07/19/2024    BASOPHIL 0.6 07/19/2024    DIFFMETHOD Automated 07/19/2024       Comprehensive Metabolic Panel  Lab Results   Component Value Date    GLU 95 07/19/2024    BUN 19 07/19/2024    CREATININE 0.85 07/19/2024     07/19/2024    K 4.3 07/19/2024     07/19/2024    PROT 7.1 07/19/2024    ALBUMIN 4.2 07/19/2024    BILITOT 0.4 07/19/2024    AST 35 07/19/2024    ALKPHOS 58 07/19/2024    CO2 28 07/19/2024    ALT 46 (H) 07/19/2024    ANIONGAP 11 07/19/2024       TSH  Lab Results   Component Value Date    TSH 1.800 07/19/2024       Assessment / Plan:      ICD-10-CM ICD-9-CM   1. Preoperative examination  Z01.818 V72.84   2. Attention deficit disorder, unspecified type  F98.8 314.00   3. BMI 60.0-69.9, adult  Z68.44 V85.44   4. Morbid obesity due to excess calories  E66.01 278.01     Preoperative examination  -     EKG 12-lead    Attention deficit disorder, unspecified type    BMI 60.0-69.9, adult  -     EKG 12-lead    Morbid obesity due to excess calories  -     EKG 12-lead    Patient without acute illness or systemic problems.  Appears to be at his baseline in regarding to his physical health.  Lab work EKG pending.  .

## 2024-11-07 ENCOUNTER — TELEPHONE (OUTPATIENT)
Dept: FAMILY MEDICINE | Facility: CLINIC | Age: 24
End: 2024-11-07
Payer: MEDICAID

## 2024-11-07 NOTE — TELEPHONE ENCOUNTER
----- Message from Lissa sent at 11/7/2024  9:24 AM CST -----  Type:  Needs Medical Advice    Who Called: Pt's mother   Symptoms (please be specific): caller states that she had a missed call regarding pt needing to schedule an EKG, pt is confused since he received paperwork for it, caller states if she can get a call back for clarification   Would the patient rather a call back or a response via [facility]? Call back   Best Call Back Number: [phone]

## 2024-11-14 LAB
OHS QRS DURATION: 120 MS
OHS QTC CALCULATION: 448 MS

## 2024-12-02 ENCOUNTER — PATIENT MESSAGE (OUTPATIENT)
Dept: CARDIOLOGY | Facility: CLINIC | Age: 24
End: 2024-12-02
Payer: MEDICAID

## 2024-12-03 ENCOUNTER — OFFICE VISIT (OUTPATIENT)
Dept: CARDIOLOGY | Facility: CLINIC | Age: 24
End: 2024-12-03
Payer: MEDICAID

## 2024-12-03 VITALS
OXYGEN SATURATION: 97 % | SYSTOLIC BLOOD PRESSURE: 130 MMHG | WEIGHT: 315 LBS | HEIGHT: 71 IN | HEART RATE: 69 BPM | DIASTOLIC BLOOD PRESSURE: 90 MMHG | BODY MASS INDEX: 44.1 KG/M2

## 2024-12-03 DIAGNOSIS — J45.909 ASTHMA, WELL CONTROLLED, UNSPECIFIED ASTHMA SEVERITY, UNSPECIFIED WHETHER PERSISTENT: ICD-10-CM

## 2024-12-03 DIAGNOSIS — Z01.810 PREOP CARDIOVASCULAR EXAM: ICD-10-CM

## 2024-12-03 DIAGNOSIS — Z01.818 PREOPERATIVE EXAMINATION: ICD-10-CM

## 2024-12-03 DIAGNOSIS — R94.31 ABNORMAL FINDING ON EKG: Primary | ICD-10-CM

## 2024-12-03 DIAGNOSIS — E66.01 MORBID OBESITY WITH BMI OF 50.0-59.9, ADULT: ICD-10-CM

## 2024-12-03 DIAGNOSIS — R94.31 ABNORMAL EKG: ICD-10-CM

## 2024-12-03 PROCEDURE — 99204 OFFICE O/P NEW MOD 45 MIN: CPT | Mod: S$PBB,,,

## 2024-12-03 PROCEDURE — 99999 PR PBB SHADOW E&M-EST. PATIENT-LVL III: CPT | Mod: PBBFAC,,,

## 2024-12-03 PROCEDURE — 99213 OFFICE O/P EST LOW 20 MIN: CPT | Mod: PBBFAC,PN

## 2024-12-03 PROCEDURE — 3075F SYST BP GE 130 - 139MM HG: CPT | Mod: CPTII,,,

## 2024-12-03 PROCEDURE — 3008F BODY MASS INDEX DOCD: CPT | Mod: CPTII,,,

## 2024-12-03 PROCEDURE — 1159F MED LIST DOCD IN RCRD: CPT | Mod: CPTII,,,

## 2024-12-03 PROCEDURE — 1160F RVW MEDS BY RX/DR IN RCRD: CPT | Mod: CPTII,,,

## 2024-12-03 PROCEDURE — 3080F DIAST BP >= 90 MM HG: CPT | Mod: CPTII,,,

## 2024-12-03 NOTE — ASSESSMENT & PLAN NOTE
There is no height or weight on file to calculate BMI. Morbid obesity complicates all aspects of disease management from diagnostic modalities to treatment. Weight loss encouraged and health benefits explained to patient.   -Followed by Bariatric medicine  -planned bariatric surgery post cardiac evaluation

## 2024-12-03 NOTE — ASSESSMENT & PLAN NOTE
EKG reviewed 11/6/24: NSR w RV conduction delay, LVH w QRS widening   -Cardiac echo to evaluate heart function and r/o LVH

## 2024-12-03 NOTE — ASSESSMENT & PLAN NOTE
Cardiac Risk Estimation: per the Revised Cardiac Risk Index (Circ. 100:1043, 1999), the patient's risk factors for cardiac complications include possible LVH, putting him in: RCI RISK CLASS II (1 risk factor, risk of major cardiac compl. appr. 1.3%)  -patient can perform > 4 METS of activity without limitation   -EKG reviewed 11/6/24: NSR w RV conduction delay, LVH w QRS widening   -Cardiac echo to evaluate heart function and r/o LVH   -Delay surgical procedure until cardiac evaluation is completed

## 2024-12-03 NOTE — PROGRESS NOTES
Subjective:    Patient ID:  James Loya is a 24 y.o. male who presents for evaluation of No chief complaint on file.      PCP: Arnie Foster MD     Referring Provider: Arnie Foster MD    HPI: Patient is a 25 yo M w/PMH of morbid obesity, ADHD, asthma, developmental delay who presents today to establish care and pre-op evaluation for bariatric surgery. He was referred by PCP for evaluation of abnormal EKG w LVH.     Patient denies CP, SOB, palpitations, orthopnea, PND, presyncope, LOC, swelling, or claudication. Patient reports medication compliance without side effects. Patient does exercise regularly, and has begun attending the gym 3-4 days/wk.      Past Medical History:   Diagnosis Date    ADHD     Asthma, well controlled     Developmental delay     Mild mental retardation     Obesity     Snoring      Past Surgical History:   Procedure Laterality Date    ADENOIDECTOMY  2015    HERNIA REPAIR  2004    None      TONSILLECTOMY  2016    TYMPANOSTOMY TUBE PLACEMENT       Social History     Socioeconomic History    Marital status: Single   Tobacco Use    Smoking status: Never     Passive exposure: Never    Smokeless tobacco: Never    Tobacco comments:     No NAZIA   Substance and Sexual Activity    Alcohol use: No    Drug use: No    Sexual activity: Never   Social History Narrative    Lives with mom, step-dad, brother.  Mom is a phlebotomist.       Social Drivers of Health     Financial Resource Strain: Low Risk  (10/28/2024)    Received from [facility]    Overall Financial Resource Strain (CARDIA)     Difficulty of Paying Living Expenses: Not hard at all   Food Insecurity: No Food Insecurity (10/28/2024)    Received from [facility]    Hunger Vital Sign     Worried About Running Out of Food in the Last Year: Never true     Ran Out of Food in the Last Year: Never true   Transportation Needs: No Transportation Needs (10/28/2024)    Received from [facility]    PRAPARE - Transportation     Lack of  Transportation (Medical): No     Lack of Transportation (Non-Medical): No   Physical Activity: Unknown (10/28/2024)    Received from [facility]    Exercise Vital Sign     Days of Exercise per Week: 2 days   Stress: No Stress Concern Present (10/28/2024)    [facility]    [employer] - Occupational Stress Questionnaire     Feeling of Stress : Not at all   Housing Stability: Unknown (10/28/2024)    Received from [facility]    Housing Stability Vital Sign     Unable to Pay for Housing in the Last Year: No     Family History   Problem Relation Name Age of Onset    Alcohol abuse Father Don     Atrial fibrillation Maternal Grandfather      Depression Paternal Grandfather Don     Mental illness Paternal Grandfather Don     Drug abuse Paternal Uncle Manan     Heart disease Maternal Grandmother Zuleima        Review of patient's allergies indicates:  No Known Allergies    Medication List with Changes/Refills   Current Medications    CETIRIZINE (ZYRTEC) 10 MG TABLET    Take 1 tablet (10 mg total) by mouth daily as needed for Allergies.    METHYLPHENIDATE HCL 54 MG CR TABLET    Take 1 tablet (54 mg total) by mouth every morning.   Discontinued Medications    HYDROXYZINE HCL (ATARAX) 25 MG TABLET    Take 1 tablet (25 mg total) by mouth every 6 (six) hours.    SEMAGLUTIDE (OZEMPIC) 0.25 MG OR 0.5 MG (2 MG/3 ML) PEN INJECTOR    Inject 0.5 mg into the skin every 7 days. Ok to compound    TIRZEPATIDE (MOUNJARO) 2.5 MG/0.5 ML PNIJ    Inject 2.5 mg into the skin every 7 days. Okay to compound medication       Review of Systems   Constitutional: Negative for diaphoresis and fever.   HENT:  Negative for congestion and hearing loss.    Eyes:  Negative for blurred vision and pain.   Cardiovascular:  Negative for chest pain, claudication, dyspnea on exertion, leg swelling, near-syncope, palpitations and syncope.   Respiratory:  Negative for shortness of breath and sleep disturbances due to  "breathing.    Hematologic/Lymphatic: Negative for bleeding problem. Does not bruise/bleed easily.   Skin:  Negative for color change and poor wound healing.   Gastrointestinal:  Negative for abdominal pain and nausea.   Genitourinary:  Negative for bladder incontinence and flank pain.   Neurological:  Negative for focal weakness and light-headedness.        Objective:   BP (!) 130/90 (BP Location: Left arm, Patient Position: Sitting)   Pulse 69   Ht 5' 11" (1.803 m)   Wt (!) 194.4 kg (428 lb 9.2 oz)   SpO2 97%   BMI 59.77 kg/m²    Physical Exam  Constitutional:       Appearance: He is well-developed. He is obese. He is not diaphoretic.   HENT:      Head: Normocephalic and atraumatic.   Eyes:      General: No scleral icterus.     Pupils: Pupils are equal, round, and reactive to light.   Neck:      Vascular: No JVD.   Cardiovascular:      Rate and Rhythm: Normal rate and regular rhythm.      Pulses: Intact distal pulses.      Heart sounds: S1 normal and S2 normal. No murmur heard.     No friction rub. No gallop.   Pulmonary:      Effort: Pulmonary effort is normal. No respiratory distress.      Breath sounds: Normal breath sounds. No wheezing or rales.   Chest:      Chest wall: No tenderness.   Abdominal:      General: Bowel sounds are normal. There is no distension.      Palpations: Abdomen is soft. There is no mass.      Tenderness: There is no abdominal tenderness. There is no rebound.   Musculoskeletal:         General: No tenderness. Normal range of motion.      Cervical back: Normal range of motion and neck supple.   Skin:     General: Skin is warm and dry.      Coloration: Skin is not pale.   Neurological:      Mental Status: He is alert and oriented to person, place, and time.      Coordination: Coordination normal.      Deep Tendon Reflexes: Reflexes normal.   Psychiatric:         Behavior: Behavior normal.         Judgment: Judgment normal.             EKG reviewed 11/6/24: NSR w RV conduction delay, " LVH w QRS widening     Assessment:       1. Abnormal finding on EKG    2. BMI 60.0-69.9, adult    3. Preoperative examination    4. Abnormal EKG    5. Preop cardiovascular exam    6. Morbid obesity with BMI of 50.0-59.9, adult    7. Asthma, well controlled, unspecified asthma severity, unspecified whether persistent         Plan:         Abnormal finding on EKG  EKG reviewed 11/6/24: NSR w RV conduction delay, LVH w QRS widening   -Cardiac echo to evaluate heart function and r/o LVH     Preop cardiovascular exam  Cardiac Risk Estimation: per the Revised Cardiac Risk Index (Circ. 100:1043, 1999), the patient's risk factors for cardiac complications include possible LVH, putting him in: RCI RISK CLASS II (1 risk factor, risk of major cardiac compl. appr. 1.3%)  -patient can perform > 4 METS of activity without limitation   -EKG reviewed 11/6/24: NSR w RV conduction delay, LVH w QRS widening   -Cardiac echo to evaluate heart function and r/o LVH   -Delay surgical procedure until cardiac evaluation is completed     Morbid obesity with BMI of 50.0-59.9, adult  There is no height or weight on file to calculate BMI. Morbid obesity complicates all aspects of disease management from diagnostic modalities to treatment. Weight loss encouraged and health benefits explained to patient.   -Followed by Bariatric medicine  -planned bariatric surgery post cardiac evaluation     Asthma, well controlled  -Managed by PCP  -controlled w medical therapy       Total duration of face to face visit time 30 minutes.  Total time spent counseling greater than fifty percent of total visit time.  Counseling included discussion regarding imaging findings, diagnosis, possibilities, treatment options, risks and benefits.  The patient had many questions regarding the options and long-term effects      Shaan Frost, EDDI  Cardiology

## 2024-12-04 ENCOUNTER — TELEPHONE (OUTPATIENT)
Dept: CARDIOLOGY | Facility: CLINIC | Age: 24
End: 2024-12-04
Payer: MEDICAID

## 2024-12-04 NOTE — TELEPHONE ENCOUNTER
Returned Mrs. Daniel's call. Her son wants to move his appointment for the echo to 9 am . Made the switch. And advised her of the appointment time and date and location.    Thank you,    Stella Grijalva  Medical Assistant   [facility]   [phone]-Phone  [phone]-Fax    ----- Message from Kenya sent at 12/4/2024  8:52 AM CST -----  Regarding: Pt's Mom Jaquelin to see if the pt can be seen at an earlier time on 12/13/24 and she would like a call back this morning from Stella  Type:  Same Day Appointment Request    Name of Caller: Pt's Mom Jaquelin to see if the pt can be seen at an earlier time on 12/13/24 and she would like a call back this morning from Stella  Best Call Back Number: [phone]

## 2024-12-10 DIAGNOSIS — F98.8 ATTENTION DEFICIT DISORDER, UNSPECIFIED TYPE: ICD-10-CM

## 2024-12-10 DIAGNOSIS — E66.01 MORBID OBESITY DUE TO EXCESS CALORIES: ICD-10-CM

## 2024-12-11 RX ORDER — METHYLPHENIDATE HYDROCHLORIDE 54 MG/1
54 TABLET ORAL EVERY MORNING
Qty: 30 TABLET | Refills: 0 | Status: SHIPPED | OUTPATIENT
Start: 2024-12-11

## 2024-12-11 NOTE — TELEPHONE ENCOUNTER
No care due was identified.  Health [facility] Embedded Care Due Messages. Reference number: [id number].   12/10/2024 6:20:19 PM CST

## 2024-12-16 ENCOUNTER — PATIENT MESSAGE (OUTPATIENT)
Dept: CARDIOLOGY | Facility: CLINIC | Age: 24
End: 2024-12-16
Payer: MEDICAID

## 2024-12-17 ENCOUNTER — DOCUMENTATION ONLY (OUTPATIENT)
Dept: CARDIOLOGY | Facility: CLINIC | Age: 24
End: 2024-12-17
Payer: MEDICAID

## 2024-12-17 DIAGNOSIS — Z01.810 PREOP CARDIOVASCULAR EXAM: Primary | ICD-10-CM

## 2024-12-17 NOTE — PROGRESS NOTES
Cardiac Risk Estimation: per the Revised Cardiac Risk Index (Circ. 100:1043, 1999), the patient's risk factors for cardiac complications include possible LVH, putting him in: RCI RISK CLASS II (1 risk factor, risk of major cardiac compl. appr. 1.3%)  -patient can perform > 4 METS of activity without limitation   -EKG reviewed 11/6/24: NSR w RV conduction delay, LVH w QRS widening   -Cardiac echo 12/13/24  Summary  Show Result Comparison     Left Ventricle: The left ventricle is normal in size. There is concentric remodeling. There is normal systolic function. Quantitated ejection fraction is 56%. There is normal diastolic function.    Right Ventricle: Systolic function is normal. TAPSE is 2.22 cm.    IVC/SVC: Normal venous pressure at 3 mmHg.    The study was difficult due to patient's body habitus.    -No additional cardiac test is required prior to procedure       Shaan Frost DNP  [facility]

## 2024-12-17 NOTE — ASSESSMENT & PLAN NOTE
Cardiac Risk Estimation: per the Revised Cardiac Risk Index (Circ. 100:1043, 1999), the patient's risk factors for cardiac complications include possible LVH, putting him in: RCI RISK CLASS II (1 risk factor, risk of major cardiac compl. appr. 1.3%)  -patient can perform > 4 METS of activity without limitation   -EKG reviewed 11/6/24: NSR w RV conduction delay, LVH w QRS widening   -Cardiac echo 12/13/24  Summary  Show Result Comparison     Left Ventricle: The left ventricle is normal in size. There is concentric remodeling. There is normal systolic function. Quantitated ejection fraction is 56%. There is normal diastolic function.    Right Ventricle: Systolic function is normal. TAPSE is 2.22 cm.    IVC/SVC: Normal venous pressure at 3 mmHg.    The study was difficult due to patient's body habitus.    -No additional cardiac test is required prior to procedure

## 2025-01-17 DIAGNOSIS — F98.8 ATTENTION DEFICIT DISORDER, UNSPECIFIED TYPE: ICD-10-CM

## 2025-01-17 DIAGNOSIS — E66.01 MORBID OBESITY DUE TO EXCESS CALORIES: ICD-10-CM

## 2025-01-17 NOTE — TELEPHONE ENCOUNTER
No care due was identified.  [facility] Embedded Care Due Messages. Reference number: [id number].   1/17/2025 5:47:59 PM CST

## 2025-01-19 RX ORDER — METHYLPHENIDATE HYDROCHLORIDE 54 MG/1
54 TABLET ORAL EVERY MORNING
Qty: 30 TABLET | Refills: 0 | Status: SHIPPED | OUTPATIENT
Start: 2025-01-19 | End: 2025-01-21 | Stop reason: SDUPTHER

## 2025-01-21 ENCOUNTER — OFFICE VISIT (OUTPATIENT)
Dept: FAMILY MEDICINE | Facility: CLINIC | Age: 25
End: 2025-01-21
Payer: MEDICAID

## 2025-01-21 ENCOUNTER — PATIENT MESSAGE (OUTPATIENT)
Dept: FAMILY MEDICINE | Facility: CLINIC | Age: 25
End: 2025-01-21

## 2025-01-21 DIAGNOSIS — E66.01 MORBID OBESITY DUE TO EXCESS CALORIES: ICD-10-CM

## 2025-01-21 DIAGNOSIS — F98.8 ATTENTION DEFICIT DISORDER, UNSPECIFIED TYPE: ICD-10-CM

## 2025-01-21 RX ORDER — METHYLPHENIDATE HYDROCHLORIDE 54 MG/1
54 TABLET ORAL EVERY MORNING
Qty: 30 TABLET | Refills: 0 | Status: SHIPPED | OUTPATIENT
Start: 2025-01-21 | End: 2025-01-24 | Stop reason: SDUPTHER

## 2025-01-21 NOTE — PROGRESS NOTES
" Patient ID: James Loya is a 24 y.o. male.    Chief Complaint: No chief complaint on file.    ADD/ADHD follow up.  HPI         James Loya is a 24 y.o. Purpose of virtual visit to continue use medications to treat attention deficit disorder.  Doing well on the medicines and does not want to change. No significant weight changes, tachycardia or agitation.    History of Present Illness                Review of Symptoms    Constitutional  No change in activity, No chills fever   Resp  Neg hemoptysis, stridor, choking  CVS  Neg chest pain, palpitations    There were no vitals taken for this visit.    Physical Exam    There were no vitals filed for this visit.    Constitutional:   Oriented to person, place, and time.appears well-developed and well-nourished.   No distress.        Psychiatric: Normal mood and affect. Behavior is normal. Judgment and thought content normal.     Physical Exam              Complete Blood Count  Lab Results   Component Value Date    RBC 4.79 07/19/2024    HGB 15.6 07/19/2024    HCT 46.9 07/19/2024    MCV 98 07/19/2024    MCH 32.6 (H) 07/19/2024    MCHC 33.3 07/19/2024    RDW 12.1 07/19/2024     07/19/2024    MPV 10.4 07/19/2024    GRAN 3.8 07/19/2024    GRAN 57.8 07/19/2024    LYMPH 2.0 07/19/2024    LYMPH 30.3 07/19/2024    MONO 0.6 07/19/2024    MONO 9.3 07/19/2024    EOS 0.1 07/19/2024    BASO 0.04 07/19/2024    EOSINOPHIL 1.5 07/19/2024    BASOPHIL 0.6 07/19/2024    DIFFMETHOD Automated 07/19/2024       Comprehensive Metabolic Panel  No results found for: "GLU", "BUN", "CREATININE", "NA", "K", "CL", "PROT", "ALBUMIN", "BILITOT", "AST", "ALKPHOS", "CO2", "ALT", "ANIONGAP", "EGFRNONAA", "ESTGFRAFRICA"    TSH  No results found for: "TSH"    Assessment / Plan:    No diagnosis found.  There are no diagnoses linked to this encounter.    Assessment & Plan              The patient location is:  Home  The chief complaint leading to consultation is:  ADD/ADHD  Visit " type: Virtual visit with synchronous audio and video  Total time spent with patient:  10 minutes  Each patient to whom he or she provides medical services by telemedicine is:  (1) informed of the relationship between the physician and patient and the respective role of any other health care provider with respect to management of the patient; and (2) notified that he or she may decline to receive medical services by telemedicine and may withdraw from such care at any time.                Arnie Foster MD                                                                      Answers submitted by the patient for this visit:  Review of Systems Questionnaire (Submitted on 1/19/2025)  activity change: No  unexpected weight change: No  neck pain: No  hearing loss: No  rhinorrhea: No  trouble swallowing: No  eye discharge: No  visual disturbance: No  chest tightness: No  wheezing: No  chest pain: No  palpitations: No  blood in stool: No  constipation: No  vomiting: No  diarrhea: No  polydipsia: No  polyuria: No  difficulty urinating: No  urgency: No  hematuria: No  joint swelling: No  arthralgias: No  headaches: No  weakness: No  confusion: No  dysphoric mood: No

## 2025-01-24 DIAGNOSIS — E66.01 MORBID OBESITY DUE TO EXCESS CALORIES: ICD-10-CM

## 2025-01-24 DIAGNOSIS — F98.8 ATTENTION DEFICIT DISORDER, UNSPECIFIED TYPE: ICD-10-CM

## 2025-01-24 RX ORDER — METHYLPHENIDATE HYDROCHLORIDE 54 MG/1
54 TABLET ORAL EVERY MORNING
Qty: 30 TABLET | Refills: 0 | Status: SHIPPED | OUTPATIENT
Start: 2025-01-24

## 2025-01-24 NOTE — TELEPHONE ENCOUNTER
No care due was identified.  [facility] Embedded Care Due Messages. Reference number: [id number].   1/24/2025 11:52:11 AM CST

## 2025-02-24 DIAGNOSIS — F98.8 ATTENTION DEFICIT DISORDER, UNSPECIFIED TYPE: ICD-10-CM

## 2025-02-24 DIAGNOSIS — E66.01 MORBID OBESITY DUE TO EXCESS CALORIES: ICD-10-CM

## 2025-02-24 RX ORDER — METHYLPHENIDATE HYDROCHLORIDE 54 MG/1
54 TABLET ORAL EVERY MORNING
Qty: 30 TABLET | Refills: 0 | Status: SHIPPED | OUTPATIENT
Start: 2025-02-24

## 2025-02-24 NOTE — TELEPHONE ENCOUNTER
No care due was identified.  [facility] Embedded Care Due Messages. Reference number: [id number].   2/24/2025 7:25:25 AM CST

## 2025-03-25 DIAGNOSIS — F98.8 ATTENTION DEFICIT DISORDER, UNSPECIFIED TYPE: ICD-10-CM

## 2025-03-25 DIAGNOSIS — E66.01 MORBID OBESITY DUE TO EXCESS CALORIES: ICD-10-CM

## 2025-03-25 NOTE — TELEPHONE ENCOUNTER
No care due was identified.  [facility] Embedded Care Due Messages. Reference number: [id number].   3/25/2025 2:17:23 PM CDT

## 2025-03-26 RX ORDER — METHYLPHENIDATE HYDROCHLORIDE 54 MG/1
54 TABLET ORAL EVERY MORNING
Qty: 30 TABLET | Refills: 0 | Status: SHIPPED | OUTPATIENT
Start: 2025-03-26

## 2025-04-28 DIAGNOSIS — E66.01 MORBID OBESITY DUE TO EXCESS CALORIES: ICD-10-CM

## 2025-04-28 DIAGNOSIS — F98.8 ATTENTION DEFICIT DISORDER, UNSPECIFIED TYPE: ICD-10-CM

## 2025-04-28 NOTE — TELEPHONE ENCOUNTER
No care due was identified.  [facility] Embedded Care Due Messages. Reference number: [id number].   4/28/2025 6:18:07 PM CDT

## 2025-04-30 RX ORDER — METHYLPHENIDATE HYDROCHLORIDE 54 MG/1
54 TABLET ORAL EVERY MORNING
Qty: 30 TABLET | Refills: 0 | Status: SHIPPED | OUTPATIENT
Start: 2025-04-30

## 2025-06-02 DIAGNOSIS — F98.8 ATTENTION DEFICIT DISORDER, UNSPECIFIED TYPE: ICD-10-CM

## 2025-06-02 DIAGNOSIS — E66.01 MORBID OBESITY DUE TO EXCESS CALORIES: ICD-10-CM

## 2025-06-03 RX ORDER — METHYLPHENIDATE HYDROCHLORIDE 54 MG/1
54 TABLET ORAL EVERY MORNING
Qty: 30 TABLET | Refills: 0 | Status: SHIPPED | OUTPATIENT
Start: 2025-06-03

## 2025-07-31 ENCOUNTER — OFFICE VISIT (OUTPATIENT)
Dept: FAMILY MEDICINE | Facility: CLINIC | Age: 25
End: 2025-07-31
Payer: MEDICAID

## 2025-07-31 VITALS
SYSTOLIC BLOOD PRESSURE: 132 MMHG | WEIGHT: 301.13 LBS | OXYGEN SATURATION: 98 % | HEIGHT: 71 IN | DIASTOLIC BLOOD PRESSURE: 82 MMHG | TEMPERATURE: 98 F | HEART RATE: 98 BPM | BODY MASS INDEX: 42.16 KG/M2

## 2025-07-31 DIAGNOSIS — F98.8 ATTENTION DEFICIT DISORDER, UNSPECIFIED TYPE: ICD-10-CM

## 2025-07-31 DIAGNOSIS — E66.01 MORBID OBESITY DUE TO EXCESS CALORIES: ICD-10-CM

## 2025-07-31 DIAGNOSIS — Z00.00 ROUTINE HEALTH MAINTENANCE: Primary | ICD-10-CM

## 2025-07-31 DIAGNOSIS — Z23 NEED FOR PNEUMOCOCCAL 20-VALENT CONJUGATE VACCINATION: ICD-10-CM

## 2025-07-31 PROCEDURE — 3008F BODY MASS INDEX DOCD: CPT | Mod: CPTII,S$GLB,, | Performed by: FAMILY MEDICINE

## 2025-07-31 PROCEDURE — 90471 IMMUNIZATION ADMIN: CPT | Mod: S$GLB,,, | Performed by: FAMILY MEDICINE

## 2025-07-31 PROCEDURE — 99395 PREV VISIT EST AGE 18-39: CPT | Mod: 25,S$GLB,, | Performed by: FAMILY MEDICINE

## 2025-07-31 PROCEDURE — 3075F SYST BP GE 130 - 139MM HG: CPT | Mod: CPTII,S$GLB,, | Performed by: FAMILY MEDICINE

## 2025-07-31 PROCEDURE — 90677 PCV20 VACCINE IM: CPT | Mod: S$GLB,,, | Performed by: FAMILY MEDICINE

## 2025-07-31 PROCEDURE — 1159F MED LIST DOCD IN RCRD: CPT | Mod: CPTII,S$GLB,, | Performed by: FAMILY MEDICINE

## 2025-07-31 PROCEDURE — 3079F DIAST BP 80-89 MM HG: CPT | Mod: CPTII,S$GLB,, | Performed by: FAMILY MEDICINE

## 2025-07-31 RX ORDER — METHYLPHENIDATE HYDROCHLORIDE 54 MG/1
54 TABLET ORAL EVERY MORNING
Qty: 30 TABLET | Refills: 0 | Status: SHIPPED | OUTPATIENT
Start: 2025-07-31

## 2025-07-31 RX ORDER — METHYLPHENIDATE HYDROCHLORIDE 54 MG/1
54 TABLET ORAL EVERY MORNING
Qty: 30 TABLET | Refills: 0 | Status: SHIPPED | OUTPATIENT
Start: 2025-09-29

## 2025-07-31 RX ORDER — METHYLPHENIDATE HYDROCHLORIDE 54 MG/1
54 TABLET ORAL EVERY MORNING
Qty: 30 TABLET | Refills: 0 | Status: SHIPPED | OUTPATIENT
Start: 2025-08-30

## 2025-07-31 NOTE — LETTER
July 31, 2025      [address] [address]  Phone: [phone]  Fax: [phone]       Patient: James Loya   YOB: 2000  Date of Visit: 07/31/2025    To Whom It May Concern:    Sherly Loya  was at [facility] on 07/31/2025. The patient may return to work/school on 7/31/2025 with no restrictions. If you have any questions or concerns, or if I can be of further assistance, please do not hesitate to contact me.    Sincerely,    Arnie Bradford MD

## 2025-08-04 NOTE — PROGRESS NOTES
" Patient ID: James Loya is a 25 y.o. male.    Chief Complaint: Annual Exam    HPI     James Loya is a 25 y.o. male. here for annual exam.     History of Present Illness    CHIEF COMPLAINT:  Patient presents today for follow up.    SURGICAL HISTORY:  He reports successful weight loss following bariatric surgery with significant improvement in body weight and stable overall wellness.    SOCIAL HISTORY:  He denies current substance abuse, intravenous drug use, or high-risk sexual behaviors including engaging with sex workers or multiple sexual partners.         Review of Symptoms    Constitutional: Negative.    HENT: Negative.    Eyes: Negative.    Respiratory: Negative.    Cardiovascular: Negative.    Gastrointestinal: Negative.    Endocrine: Negative.    Genitourinary: Negative.    Musculoskeletal: Negative.    Skin: Negative.    Allergic/Immunologic: Negative.    Neurological: Negative.    Hematological: Negative.    Psychiatric/Behavioral: Negative.      Except as above in HPI    Medications Ordered Prior to Encounter[1]      Physical  Exam    Vitals:    07/31/25 1051   BP: 132/82   BP Location: Left arm   Patient Position: Sitting   Pulse: 98   Temp: 98.3 °F (36.8 °C)   TempSrc: Oral   SpO2: 98%   Weight: (!) 136.6 kg (301 lb 2.4 oz)   Height: 5' 11" (1.803 m)          Constitutional:  Oriented to person, place, and time. Appears well-developed and well-nourished.  Significant weight loss looks great tolerating gastric bypass well    HENT:   Head: Normocephalic and atraumatic.     Right Ear: External ear normal     Left Ear: External ear normal      Nose: Nose normal. No rhinorrhea or nasal deformity.     Mouth/Throat: Moist mucus membranes      Eyes: Conjunctivae are normal. Right eye exhibits no discharge. Left eye exhibits no  discharge. No scleral icterus.     Neck:  No JVD present. No tracheal deviation  []  Neck supple.   Carotid Arteries  []  No Bruit    Cardiovascular:  Regular rate " and rhythm with normal S1 and S2     Pulmonary/Chest:   Clear to auscultation bilaterally without wheezes, rhonchi or rales    Abdominal: Soft. No distension and no mass.  No tenderness. No rebound and No guarding.     Musculoskeletal: Normal range of motion. No edema or tenderness.   No deformity     Lymphadenopathy:   []  No cervical adenopathy.  []  No inguinal adenopathy    Neurological:  Alert and oriented to person, place, and time. Coordination normal.     Skin: Skin is warm and dry. No rash noted. No bruising     Psychiatric: Normal mood and affect. Speech is normal and behavior is normal. Judgment and thought content normal.     Physical Exam                Assessment / Plan:      ICD-10-CM ICD-9-CM   1. Routine health maintenance  Z00.00 V70.0   2. Need for pneumococcal 20-valent conjugate vaccination  Z23 V03.82   3. Attention deficit disorder, unspecified type  F98.8 314.00   4. BMI 60.0-69.9, adult  Z68.44 V85.44   5. Morbid obesity due to excess calories  E66.01 278.01     Routine health maintenance    Need for pneumococcal 20-valent conjugate vaccination  -     pneumoc 20-dang conj-dip cr(PF) (PREVNAR-20 (PF)) injection Syrg 0.5 mL    Attention deficit disorder, unspecified type  -     methylphenidate HCl 54 MG CR tablet; Take 1 tablet (54 mg total) by mouth every morning.  Dispense: 30 tablet; Refill: 0  -     methylphenidate HCl 54 MG CR tablet; Take 1 tablet (54 mg total) by mouth every morning.  Dispense: 30 tablet; Refill: 0  -     methylphenidate HCl 54 MG CR tablet; Take 1 tablet (54 mg total) by mouth every morning.  Dispense: 30 tablet; Refill: 0    BMI 60.0-69.9, adult  -     methylphenidate HCl 54 MG CR tablet; Take 1 tablet (54 mg total) by mouth every morning.  Dispense: 30 tablet; Refill: 0  -     methylphenidate HCl 54 MG CR tablet; Take 1 tablet (54 mg total) by mouth every morning.  Dispense: 30 tablet; Refill: 0  -     methylphenidate HCl 54 MG CR tablet; Take 1 tablet (54 mg total)  by mouth every morning.  Dispense: 30 tablet; Refill: 0    Morbid obesity due to excess calories  -     methylphenidate HCl 54 MG CR tablet; Take 1 tablet (54 mg total) by mouth every morning.  Dispense: 30 tablet; Refill: 0  -     methylphenidate HCl 54 MG CR tablet; Take 1 tablet (54 mg total) by mouth every morning.  Dispense: 30 tablet; Refill: 0  -     methylphenidate HCl 54 MG CR tablet; Take 1 tablet (54 mg total) by mouth every morning.  Dispense: 30 tablet; Refill: 0        Discussed how to stay healthy     Assessment & Plan    - Assessed general wellness and noted significant weight loss from bariatric surgery.  - Evaluated ADHD management and continued current medication regimen, prescribing for 3 months at a time.  - Determined patient is not at high risk for HIV or Hepatitis C based on reported lifestyle.    BARIATRIC SURGERY STATUS:  - Monitored the patient's significant weight loss following bariatric surgery.  - Patient is in great shape, indicating successful post-surgical outcome and effective weight management.    IMMUNIZATIONS:  - Administered pneumonia vaccine in office.    PREVENTIVE CARE:  - Explained importance of HIV and Hepatitis C screening for at-risk individuals.         This note was generated with the assistance of ambient listening technology. Verbal consent was obtained by the patient and accompanying visitor(s) for the recording of patient appointment to facilitate this note. I attest to having reviewed and edited the generated note for accuracy, though some syntax or spelling errors may persist. Please contact the author of this note for any clarification.              Arnie Weiss M.D.         [1]   Current Outpatient Medications on File Prior to Visit   Medication Sig Dispense Refill    cetirizine (ZYRTEC) 10 MG tablet Take 1 tablet (10 mg total) by mouth daily as needed for Allergies. (Patient not taking: Reported on 12/3/2024) 90 tablet 3     No current  facility-administered medications on file prior to visit.

## (undated) DEVICE — SEE MEDLINE ITEM 154981

## (undated) DEVICE — DRESSING XEROFORM FOIL PK 1X8

## (undated) DEVICE — SYR B-D DISP CONTROL 10CC100/C

## (undated) DEVICE — GAUZE SPONGE 4X4 12PLY

## (undated) DEVICE — PAD PREP 50/CA

## (undated) DEVICE — GLOVE BIOGEL ECLIPSE SZ 7.5

## (undated) DEVICE — NDL 22GA X1 1/2 REG BEVEL

## (undated) DEVICE — SPONGE DERMACEA GAUZE 4X4

## (undated) DEVICE — SEE MEDLINE ITEM 156953

## (undated) DEVICE — SEE MEDLINE ITEM 146308

## (undated) DEVICE — COVER OVERHEAD SURG LT BLUE